# Patient Record
Sex: MALE | Race: WHITE | NOT HISPANIC OR LATINO | Employment: FULL TIME | URBAN - METROPOLITAN AREA
[De-identification: names, ages, dates, MRNs, and addresses within clinical notes are randomized per-mention and may not be internally consistent; named-entity substitution may affect disease eponyms.]

---

## 2018-03-15 ENCOUNTER — OFFICE VISIT (OUTPATIENT)
Dept: OTOLARYNGOLOGY | Facility: CLINIC | Age: 28
End: 2018-03-15
Payer: COMMERCIAL

## 2018-03-15 VITALS
RESPIRATION RATE: 16 BRPM | HEIGHT: 68 IN | SYSTOLIC BLOOD PRESSURE: 159 MMHG | DIASTOLIC BLOOD PRESSURE: 82 MMHG | BODY MASS INDEX: 19.88 KG/M2 | HEART RATE: 90 BPM | WEIGHT: 131.2 LBS

## 2018-03-15 DIAGNOSIS — H69.93 DYSFUNCTION OF BOTH EUSTACHIAN TUBES: ICD-10-CM

## 2018-03-15 DIAGNOSIS — H61.23 BILATERAL IMPACTED CERUMEN: Primary | ICD-10-CM

## 2018-03-15 PROCEDURE — 99213 OFFICE O/P EST LOW 20 MIN: CPT | Mod: 25 | Performed by: OTOLARYNGOLOGY

## 2018-03-15 PROCEDURE — 69210 REMOVE IMPACTED EAR WAX UNI: CPT | Performed by: OTOLARYNGOLOGY

## 2018-03-15 NOTE — PATIENT INSTRUCTIONS
Scheduling Information  To schedule your CT/MRI scan, please contact [facility] at [phone] OR [facility] at [phone]    To schedule your Surgery, please contact our Specialty Schedulers at [phone]    ** If a CT scan or biopsy were ordered/done, Dr. Chen will need to see you back in clinic to go over your biopsy results or CT/MRI scan. He will go over the images from your scan with you and discuss treatment based on your results.     ENT Clinic Locations Clinic Hours Telephone Number     [facility]  [address]  [place] [address]   E/O Thursday:      7:30am -- 4:00pm   To schedule/reschedule an appointment with   Dr. Chen,   please contact our   Specialty Scheduling Department at:     [phone]       [facility] [place]  [address] [address]     Monday:              12:00pm -- 4:00pm    Tuesday:               8:30am -- 4:30pm    Wednesday:        12:00pm -- 4:00pm    E/O Thursday:        8:00am - 4:30pm           Urgent Care Locations Clinic Hours Telephone Numbers     [facility] [place]  [address]  [place] [address]     Monday-Friday:     11:00am   9:00pm    Saturday-Sunday:  9:00am   5:00pm   [phone]     [facility] [place]  [address] [place] [address]     Monday-Friday:      5:00pm - 9:00pm     Saturday-Sunday:  9:00am   5:00pm   [phone]

## 2018-03-15 NOTE — PROGRESS NOTES
History of Present Illness - Arcadio Adams is a 27 year old male I have seen in the past with infected sebaceous cysts. Today he returns because he feels his ears have been plugged for weeks. He is about to embark on a trip to [place], and wanted to make sure his ears were taken care of first. He knows he has frequent cerumen accumulation. He also reports some trouble with autoinsufflation.    Past Medical History -   Patient Active Problem List   Diagnosis     Acne vulgaris     CARDIOVASCULAR SCREENING; LDL GOAL LESS THAN 160     Primary hyperthyroidism     Vitreous syneresis of right eye       Current Medications -   Current Outpatient Prescriptions:      clindamycin (CLINDAMAX) 1 % gel, Apply topically At Bedtime To the face and back., Disp: 45 g, Rfl: 2     benzoyl peroxide 5 % gel, Apply topically At Bedtime To the face and back., Disp: 45 g, Rfl: 2     amoxicillin-clavulanate (AUGMENTIN) 875-125 MG per tablet, Take 1 tablet by mouth 2 times daily (Patient not taking: Reported on 3/15/2018), Disp: 20 tablet, Rfl: 0     methimazole (TAPAZOLE) 10 MG tablet, , Disp: , Rfl: 1     tretinoin (RETIN-A) 0.05 % cream, Apply topically every morning To the face and back. (Patient not taking: Reported on 3/15/2018), Disp: 45 g, Rfl: 1    Allergies - No Known Allergies    Social History -   Social History     Social History     Marital status: Single     Spouse name: N/A     Number of children: N/A     Years of education: N/A     Social History Main Topics     Smoking status: Never Smoker     Smokeless tobacco: Never Used     Alcohol use Yes     Drug use: No     Sexual activity: No     Other Topics Concern     None     Social History Narrative       Family History -   Family History   Problem Relation Age of Onset     C.A.D. No family hx of      CEREBROVASCULAR DISEASE No family hx of      Glaucoma No family hx of      Macular Degeneration No family hx of      Myocardial Infarction Maternal Uncle 54     Anesthesia  "Reaction No family hx of      Thrombophilia No family hx of      Hypertension Mother      Bleeding Disorder No family hx of      Coronary Artery Disease No family hx of      DIABETES No family hx of      CANCER No family hx of        Review of Systems - As per HPI and PMHx, otherwise 7 system review of the head and neck negative. 10+ system review negative.    Exam:  /82  Pulse 90  Resp 16  Ht 1.715 m (5' 7.5\")  Wt 59.5 kg (131 lb 3.2 oz)  BMI 20.25 kg/m2  General - The patient is well nourished and well developed, and appears to have good nutritional status.  Alert and oriented to person and place, answers questions and cooperates with examination appropriately.   Head and Face - Normocephalic and atraumatic, with no gross asymmetry noted of the contour of the facial features.  The facial nerve is intact, with strong symmetric movements.  Eyes - Extraocular movements intact.  Sclera were not icteric or injected, conjunctiva were pink and moist.  Ears - after cerumen removed, bilateral ear canals and Tympanic membranes appear normal.    Procedure: Cerumen Disimpaction  Diagnosis: Cerumen Impaction    Procedure and Findings  Ears - On examination of the ears, I found that the  ears were impacted with dense cerumen obscuring visualization of the right and left TM.  Therefore, I positioned the patient and I used the binocular surgical microscope to assist in visualization of the affected ear(s).  I began by using a cerumen loop to gently lift the edges of the cerumen mass away from the walls of the external canal.  Once I did this, I was able to suction away fragments of wax and debris using suction.  Once the mass was loose enough, the entire plug was pulled from the canal with microforceps.  The tympanic membrane was intact without sign of perforation or middle ear effusion and minimal ear canal trauma.             A/P - Arcadio Adams is a 27 year old male with bilateral cerumen impaction. I have " cleared this today. I also did some counseling on autoinsufflation technique.  Return if there is more trouble with hearing, or if he would like to set up excision of his multiple sebaceous cysts.      Dr. Albert Chen MD  Otolaryngology  [facility]

## 2018-03-15 NOTE — MR AVS SNAPSHOT
After Visit Summary   3/15/2018    Arcadio Adams    MRN: [id number]           Patient Information     Date Of Birth          1990        Visit Information        Provider Department      3/15/2018 9:15 AM Albert Chen MD [facility]        Today's Diagnoses     Bilateral impacted cerumen    -  1    Dysfunction of both eustachian tubes          Care Instructions    Scheduling Information  To schedule your CT/MRI scan, please contact [facility] at [phone] OR [facility] at [phone]    To schedule your Surgery, please contact our Specialty Schedulers at [phone]    ** If a CT scan or biopsy were ordered/done, Dr. Chen will need to see you back in clinic to go over your biopsy results or CT/MRI scan. He will go over the images from your scan with you and discuss treatment based on your results.     ENT Clinic Locations Clinic Hours Telephone Number     [facility]  [address]  [place] [address]   E/O Thursday:      7:30am -- 4:00pm   To schedule/reschedule an appointment with   Dr. Chen,   please contact our   Specialty Scheduling Department at:     [phone]       [facility]  [address] [place] [address]     Monday:              12:00pm -- 4:00pm    Tuesday:               8:30am -- 4:30pm    Wednesday:        12:00pm -- 4:00pm    E/O Thursday:        8:00am - 4:30pm           Urgent Care Locations Clinic Hours Telephone Numbers     [facility]  [address]  [place] [address]     Monday-Friday:     11:00am - 9:00pm    Saturday-Sunday:  9:00am - 5:00pm   [phone]     [facility] [place]  [address] [place] [address]     Monday-Friday:      5:00pm - 9:00pm     Saturday-Sunday:  9:00am - 5:00pm   [phone]               Follow-ups after your visit        Who to contact     If you have questions or need follow up information about today's clinic visit or your schedule please  "contact [facility] directly at [phone].  Normal or non-critical lab and imaging results will be communicated to you by MyChart, letter or phone within 4 business days after the clinic has received the results. If you do not hear from us within 7 days, please contact the clinic through MyChart or phone. If you have a critical or abnormal lab result, we will notify you by phone as soon as possible.  Submit refill requests through [employer] or call your pharmacy and they will forward the refill request to us. Please allow 3 business days for your refill to be completed.          Additional Information About Your Visit        meebeeharMobile Messenger Information     [employer] gives you secure access to your electronic health record. If you see a primary care provider, you can also send messages to your care team and make appointments. If you have questions, please call your primary care clinic.  If you do not have a primary care provider, please call [phone] and they will assist you.        Care EveryWhere ID     This is your Care EveryWhere ID. This could be used by other organizations to access your [facility] medical records  [id number]        Your Vitals Were     Pulse Respirations Height BMI (Body Mass Index)          90 16 1.715 m (5' 7.5\") 20.25 kg/m2         Blood Pressure from Last 3 Encounters:   03/15/18 159/82   10/06/16 126/78   06/30/16 140/85    Weight from Last 3 Encounters:   03/15/18 59.5 kg (131 lb 3.2 oz)   11/03/16 58.3 kg (128 lb 9.6 oz)   10/20/16 58.1 kg (128 lb)              We Performed the Following     [facility]        Primary Care Provider Office Phone # Fax #    Lavell Hand [phone]       [address]  [place] [facility] [address]        Equal Access to Services     [facility] MARISA : Hadii aad ku hadasho Soomaali, waaxda luqadaha, qaybta kaalmada adeegyada, waxay rhiannon cerda. So [facility] [phone].    ATENCIÓN: Si habla español, tiene a dunbar " disposición servicios gratuitos de asistencia lingüística. La chung [phone].    We comply with applicable federal civil rights laws and [place] laws. We do not discriminate on the basis of race, color, national origin, age, disability, sex, sexual orientation, or gender identity.            Thank you!     Thank you for choosing [facility]  for your care. Our goal is always to provide you with excellent care. Hearing back from our patients is one way we can continue to improve our services. Please take a few minutes to complete the written survey that you may receive in the mail after your visit with us. Thank you!             Your Updated Medication List - Protect others around you: Learn how to safely use, store and throw away your medicines at www.disposemymeds.org.          This list is accurate as of 3/15/18  9:39 AM.  Always use your most recent med list.                   Brand Name Dispense Instructions for use Diagnosis    amoxicillin-clavulanate 875-125 MG per tablet    AUGMENTIN    20 tablet    Take 1 tablet by mouth 2 times daily    Sebaceous cyst       benzoyl peroxide 5 % topical gel     45 g    Apply topically At Bedtime To the face and back.    Acne vulgaris       clindamycin 1 % topical gel    CLINDAMAX    45 g    Apply topically At Bedtime To the face and back.    Acne vulgaris       methimazole 10 MG tablet    TAPAZOLE          tretinoin 0.05 % cream    RETIN-A    45 g    Apply topically every morning To the face and back.    Acne vulgaris

## 2018-03-15 NOTE — LETTER
3/15/2018         RE: Arcadio Adams  [address]  [place] [address]        Dear Colleague,    Thank you for referring your patient, Arcadio Adams, to the [facility]. Please see a copy of my visit note below.    History of Present Illness - Arcadio Adams is a 27 year old male I have seen in the past with infected sebaceous cysts. Today he returns because he feels his ears have been plugged for weeks. He is about to embark on a trip to [place], and wanted to make sure his ears were taken care of first. He knows he has frequent cerumen accumulation. He also reports some trouble with autoinsufflation.    Past Medical History -   Patient Active Problem List   Diagnosis     Acne vulgaris     CARDIOVASCULAR SCREENING; LDL GOAL LESS THAN 160     Primary hyperthyroidism     Vitreous syneresis of right eye       Current Medications -   Current Outpatient Prescriptions:      clindamycin (CLINDAMAX) 1 % gel, Apply topically At Bedtime To the face and back., Disp: 45 g, Rfl: 2     benzoyl peroxide 5 % gel, Apply topically At Bedtime To the face and back., Disp: 45 g, Rfl: 2     amoxicillin-clavulanate (AUGMENTIN) 875-125 MG per tablet, Take 1 tablet by mouth 2 times daily (Patient not taking: Reported on 3/15/2018), Disp: 20 tablet, Rfl: 0     methimazole (TAPAZOLE) 10 MG tablet, , Disp: , Rfl: 1     tretinoin (RETIN-A) 0.05 % cream, Apply topically every morning To the face and back. (Patient not taking: Reported on 3/15/2018), Disp: 45 g, Rfl: 1    Allergies - No Known Allergies    Social History -   Social History     Social History     Marital status: Single     Spouse name: N/A     Number of children: N/A     Years of education: N/A     Social History Main Topics     Smoking status: Never Smoker     Smokeless tobacco: Never Used     Alcohol use Yes     Drug use: No     Sexual activity: No     Other Topics Concern     None     Social History Narrative       Family History -  "  Family History   Problem Relation Age of Onset     C.A.D. No family hx of      CEREBROVASCULAR DISEASE No family hx of      Glaucoma No family hx of      Macular Degeneration No family hx of      Myocardial Infarction Maternal Uncle 54     Anesthesia Reaction No family hx of      Thrombophilia No family hx of      Hypertension Mother      Bleeding Disorder No family hx of      Coronary Artery Disease No family hx of      DIABETES No family hx of      CANCER No family hx of        Review of Systems - As per HPI and PMHx, otherwise 7 system review of the head and neck negative. 10+ system review negative.    Exam:  /82  Pulse 90  Resp 16  Ht 1.715 m (5' 7.5\")  Wt 59.5 kg (131 lb 3.2 oz)  BMI 20.25 kg/m2  General - The patient is well nourished and well developed, and appears to have good nutritional status.  Alert and oriented to person and place, answers questions and cooperates with examination appropriately.   Head and Face - Normocephalic and atraumatic, with no gross asymmetry noted of the contour of the facial features.  The facial nerve is intact, with strong symmetric movements.  Eyes - Extraocular movements intact.  Sclera were not icteric or injected, conjunctiva were pink and moist.  Ears - after cerumen removed, bilateral ear canals and Tympanic membranes appear normal.    Procedure: Cerumen Disimpaction  Diagnosis: Cerumen Impaction    Procedure and Findings  Ears - On examination of the ears, I found that the  ears were impacted with dense cerumen obscuring visualization of the right and left TM.  Therefore, I positioned the patient and I used the binocular surgical microscope to assist in visualization of the affected ear(s).  I began by using a cerumen loop to gently lift the edges of the cerumen mass away from the walls of the external canal.  Once I did this, I was able to suction away fragments of wax and debris using suction.  Once the mass was loose enough, the entire plug was pulled " from the canal with microforceps.  The tympanic membrane was intact without sign of perforation or middle ear effusion and minimal ear canal trauma.             A/P - Arcadio Adams is a 27 year old male with bilateral cerumen impaction. I have cleared this today. I also did some counseling on autoinsufflation technique.  Return if there is more trouble with hearing, or if he would like to set up excision of his multiple sebaceous cysts.      Dr. Albert Chen MD  Otolaryngology  [facility]      Again, thank you for allowing me to participate in the care of your patient.        Sincerely,        Albert Chen MD

## 2018-05-03 ENCOUNTER — OFFICE VISIT (OUTPATIENT)
Dept: ALLERGY | Facility: CLINIC | Age: 28
End: 2018-05-03
Payer: COMMERCIAL

## 2018-05-03 VITALS
SYSTOLIC BLOOD PRESSURE: 158 MMHG | TEMPERATURE: 97.3 F | BODY MASS INDEX: 20.4 KG/M2 | DIASTOLIC BLOOD PRESSURE: 80 MMHG | WEIGHT: 130 LBS | OXYGEN SATURATION: 97 % | HEIGHT: 67 IN | HEART RATE: 86 BPM

## 2018-05-03 DIAGNOSIS — J30.89 ALLERGIC RHINITIS DUE TO DUST MITE: Primary | ICD-10-CM

## 2018-05-03 DIAGNOSIS — J30.1 CHRONIC SEASONAL ALLERGIC RHINITIS DUE TO POLLEN: ICD-10-CM

## 2018-05-03 PROCEDURE — 36415 COLL VENOUS BLD VENIPUNCTURE: CPT | Performed by: ALLERGY & IMMUNOLOGY

## 2018-05-03 PROCEDURE — 99203 OFFICE O/P NEW LOW 30 MIN: CPT | Mod: 25 | Performed by: ALLERGY & IMMUNOLOGY

## 2018-05-03 PROCEDURE — 86003 ALLG SPEC IGE CRUDE XTRC EA: CPT | Performed by: ALLERGY & IMMUNOLOGY

## 2018-05-03 PROCEDURE — 95004 PERQ TESTS W/ALRGNC XTRCS: CPT | Performed by: ALLERGY & IMMUNOLOGY

## 2018-05-03 NOTE — PROGRESS NOTES
Arcadio Adams was seen in the Allergy Clinic at [facility]. 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 Will obtain in vitro IgE testing to cat and dog  2. Continue second generation antihistamine such as claritin, zyrtec, or allegra daily as needed  3. Follow-up in the allergy clinic as needed      Arcadio Adams is a 27 year old White male being seen today in consultation for allergies. He states that he had a pet allergy as a child and would like to know if he is still allergic as he is considering getting a dog. Vishal has been around animals on occasion as an adult and has not noticed any particular allergy symptoms. He does have seasonal symptoms consisting of sneezing, rhinorrhea, and nasal congestion in the fall months. He will take claritin or allegra as needed for these symptoms and finds it to be helpful.       Past Medical History:   Diagnosis Date     Abscess 7/15/2013     Acne      Prolonged emergence from general anesthesia     and nausea     Family History   Problem Relation Age of Onset     Hypertension Mother      Myocardial Infarction Maternal Uncle 54     C.A.D. No family hx of      CEREBROVASCULAR DISEASE No family hx of      Glaucoma No family hx of      Macular Degeneration No family hx of      Anesthesia Reaction No family hx of      Thrombophilia No family hx of      Bleeding Disorder No family hx of      Coronary Artery Disease No family hx of      DIABETES No family hx of      CANCER No family hx of      Past Surgical History:   Procedure Laterality Date     ENT SURGERY Right 10-20-16    I & D Neck abscess. Dr. Chen     EXTRACTION(S) DENTAL      wisdom     PE TUBES  age 6       ENVIRONMENTAL HISTORY: [place]. The home is heated with a forced air. They does have central air conditioning. The patient's bedroom is furnished with hard  jose antonio in bedroom.  Pets inside the house include None. There is not history of cockroach or mice infestation. There is/are 0 smokers in the house.  The house does not have a damp basement.     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 He has missed 0 days of school/work. He lives with his roommate.  His roommate works as a retail.    REVIEW OF SYSTEMS:  General: negative for weight gain. negative for weight loss. negative for changes in sleep.   Eyes: negative for itching. negative for redness. negative for tearing/watering.  Ears: negative for fullness. negative for hearing loss. negative for dizziness.   Nose: negative for snoring.negative for changes in smell. negative for drainage.   Throat: negative for hoarseness. negative for sore throat. negative for trouble swallowing.   Lungs: negative for shortness of breath.negative for wheezing. negative for sputum production.   Cardiovascular: negative for chest pain. negative for swelling of ankles. negative for fast or irregular heartbeat.   Gastrointestinal: negative for nausea. negative for heartburn. negative for acid reflux.   Musculoskeletal: negative for joint pain. negative for joint stiffness. negative for joint swelling.   Neurologic: negative for seizures. negative for fainting. negative for weakness.   Psychiatric: negative for changes in mood. negative for anxiety.   Endocrine: negative for cold intolerance. negative for heat intolerance. negative for tremors.   Hematologic: negative for easy bruising. negative for easy bleeding.  Integumentary: negative for rash. negative for scaling. negative for nail changes.     No current outpatient prescriptions on file.  Immunization History   Administered Date(s) Administered     DTAP (<7y) 1990, 1990, 1990, 01/04/1993, 05/29/1996, 08/28/2003     HPV 10/06/2016     HepB 08/15/2002, 01/16/2003, 05/28/2003     Hib (PRP-T) 1990, 1990, 1990, 01/04/1993     Influenza (IIV3) PF  "11/02/2005, 11/10/2007, 11/20/2008, 09/26/2009, 12/11/2010     Influenza Vaccine IM 3yrs+ 4 Valent IIV4 10/06/2016     MMR 08/20/1992, 08/15/2002     OPV, trivalent, live 1991, 1991, 01/04/1993, 05/29/1996     TD (ADULT, 7+) 05/28/2003     TDAP Vaccine (Adacel) 06/04/2015     No Known Allergies      EXAM:   /80  Pulse 86  Temp 97.3  F (36.3  C) (Oral)  Ht 1.706 m (5' 7.16\")  Wt 59 kg (130 lb)  SpO2 97%  BMI 20.26 kg/m2  GENERAL APPEARANCE: alert, cooperative and not in distress  SKIN: no rashes, no lesions  HEAD: atraumatic, normocephalic  EYES: lids and lashes normal, conjunctivae and sclerae clear, pupils equal, round, reactive to light, EOM full and intact  ENT: no scars or lesions, nasal exam showed no discharge, swelling or lesions noted, otoscopy showed external auditory canals clear, tympanic membranes normal, tongue midline and normal, soft palate, uvula, and tonsils normal  NECK: no asymmetry, masses, or scars, supple without significant adenopathy  LUNGS: unlabored respirations, no intercostal retractions or accessory muscle use, clear to auscultation without rales or wheezes  HEART: regular rate and rhythm without murmurs and normal S1 and S2  MUSCULOSKELETAL: no musculoskeletal defects are noted  NEURO: no focal deficits noted  PSYCH: does not appear depressed or anxious    WORKUP: Skin testing    Skin testing was performed to our adult aeroallergen panel. He had positive reaction to dust mite (dp and df) and ragweed. All others were negative including histamine control.    ASSESSMENT/PLAN:  Arcadio Adams is a 27 year old male here for evaluation of allergies. Skin prick testing could not be fully interpreted due to blunted histamine control. We discussed pursuing further evaluation with IgE testing and patient was agreeable with this.    1. Will obtain in vitro IgE testing to cat and dog  2. Continue second generation antihistamine such as claritin, zyrtec, or allegra " daily as needed  3. Follow-up in the allergy clinic as needed      Philippe Marcus MD  Allergy/Immunology  [place], [place]      Chart documentation done in part with Dragon Voice Recognition Software. Although reviewed after completion, some word and grammatical errors may remain.

## 2018-05-03 NOTE — LETTER
5/3/2018         RE: Arcadio Adams  [address]  [place] [address]        Dear Colleague,    Thank you for referring your patient, Arcadio Adams, to the [facility]. Please see a copy of my visit note below.    Arcadio Adams was seen in the Allergy Clinic at [facility]. 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 Will obtain in vitro IgE testing to cat and dog  2. Continue second generation antihistamine such as claritin, zyrtec, or allegra daily as needed  3. Follow-up in the allergy clinic as needed      Arcadio Adams is a 27 year old White male being seen today in consultation for allergies. He states that he had a pet allergy as a child and would like to know if he is still allergic as he is considering getting a dog. Vishal has been around animals on occasion as an adult and has not noticed any particular allergy symptoms. He does have seasonal symptoms consisting of sneezing, rhinorrhea, and nasal congestion in the fall months. He will take claritin or allegra as needed for these symptoms and finds it to be helpful.       Past Medical History:   Diagnosis Date     Abscess 7/15/2013     Acne      Prolonged emergence from general anesthesia     and nausea     Family History   Problem Relation Age of Onset     Hypertension Mother      Myocardial Infarction Maternal Uncle 54     C.A.D. No family hx of      CEREBROVASCULAR DISEASE No family hx of      Glaucoma No family hx of      Macular Degeneration No family hx of      Anesthesia Reaction No family hx of      Thrombophilia No family hx of      Bleeding Disorder No family hx of      Coronary Artery Disease No family hx of      DIABETES No family hx of      CANCER No family hx of      Past Surgical History:   Procedure Laterality Date     ENT SURGERY Right 10-20-16    I & D Neck abscess. Dr. Chen      EXTRACTION(S) DENTAL      wisdom     PE TUBES  age 6       ENVIRONMENTAL HISTORY: [place]. The home is heated with a forced air. They does have central air conditioning. The patient's bedroom is furnished with hard jose antonio in bedroom.  Pets inside the house include None. There is not history of cockroach or mice infestation. There is/are 0 smokers in the house.  The house does not have a damp basement.     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 He has missed 0 days of school/work. He lives with his roommate.  His roommate works as a retail.    REVIEW OF SYSTEMS:  General: negative for weight gain. negative for weight loss. negative for changes in sleep.   Eyes: negative for itching. negative for redness. negative for tearing/watering.  Ears: negative for fullness. negative for hearing loss. negative for dizziness.   Nose: negative for snoring.negative for changes in smell. negative for drainage.   Throat: negative for hoarseness. negative for sore throat. negative for trouble swallowing.   Lungs: negative for shortness of breath.negative for wheezing. negative for sputum production.   Cardiovascular: negative for chest pain. negative for swelling of ankles. negative for fast or irregular heartbeat.   Gastrointestinal: negative for nausea. negative for heartburn. negative for acid reflux.   Musculoskeletal: negative for joint pain. negative for joint stiffness. negative for joint swelling.   Neurologic: negative for seizures. negative for fainting. negative for weakness.   Psychiatric: negative for changes in mood. negative for anxiety.   Endocrine: negative for cold intolerance. negative for heat intolerance. negative for tremors.   Hematologic: negative for easy bruising. negative for easy bleeding.  Integumentary: negative for rash. negative for scaling. negative for nail changes.     No current outpatient prescriptions on file.  Immunization History  "  Administered Date(s) Administered     DTAP (<7y) 1990, 1990, 1990, 01/04/1993, 05/29/1996, 08/28/2003     HPV 10/06/2016     HepB 08/15/2002, 01/16/2003, 05/28/2003     Hib (PRP-T) 1990, 1990, 1991, 01/04/1993     Influenza (IIV3) PF 11/02/2005, 11/10/2007, 11/20/2008, 09/26/2009, 12/11/2010     Influenza Vaccine IM 3yrs+ 4 Valent IIV4 10/06/2016     MMR 08/20/1992, 08/15/2002     OPV, trivalent, live 1991, 1991, 01/04/1993, 05/29/1996     TD (ADULT, 7+) 05/28/2003     TDAP Vaccine (Adacel) 06/04/2015     No Known Allergies      EXAM:   /80  Pulse 86  Temp 97.3  F (36.3  C) (Oral)  Ht 1.706 m (5' 7.16\")  Wt 59 kg (130 lb)  SpO2 97%  BMI 20.26 kg/m2  GENERAL APPEARANCE: alert, cooperative and not in distress  SKIN: no rashes, no lesions  HEAD: atraumatic, normocephalic  EYES: lids and lashes normal, conjunctivae and sclerae clear, pupils equal, round, reactive to light, EOM full and intact  ENT: no scars or lesions, nasal exam showed no discharge, swelling or lesions noted, otoscopy showed external auditory canals clear, tympanic membranes normal, tongue midline and normal, soft palate, uvula, and tonsils normal  NECK: no asymmetry, masses, or scars, supple without significant adenopathy  LUNGS: unlabored respirations, no intercostal retractions or accessory muscle use, clear to auscultation without rales or wheezes  HEART: regular rate and rhythm without murmurs and normal S1 and S2  MUSCULOSKELETAL: no musculoskeletal defects are noted  NEURO: no focal deficits noted  PSYCH: does not appear depressed or anxious    WORKUP: Skin testing    Skin testing was performed to our adult aeroallergen panel. He had positive reaction to dust mite (dp and df) and ragweed. All others were negative including histamine control.    ASSESSMENT/PLAN:  Arcadio Adams is a 27 year old male here for evaluation of allergies. Skin prick testing could not be fully " interpreted due to blunted histamine control. We discussed pursuing further evaluation with IgE testing and patient was agreeable with this.    1. Will obtain in vitro IgE testing to cat and dog  2. Continue second generation antihistamine such as claritin, zyrtec, or allegra daily as needed  3. Follow-up in the allergy clinic as needed      Philippe Marcus MD  Allergy/Immunology  [facility] and [place], [place]      Chart documentation done in part with Dragon Voice Recognition Software. Although reviewed after completion, some word and grammatical errors may remain.      Again, thank you for allowing me to participate in the care of your patient.        Sincerely,        Philippe Marcus MD

## 2018-05-03 NOTE — PATIENT INSTRUCTIONS
If you have any questions regarding your allergies, asthma, or what we discussed during your visit today please call the allergy clinic or contact us via Crovat.    [facility] ([place], [place]): [phone]  [facility]: [phone]

## 2018-05-03 NOTE — MR AVS SNAPSHOT
After Visit Summary   5/3/2018    Arcadio Adams    MRN: [id number]           Patient Information     Date Of Birth          1990        Visit Information        Provider Department      5/3/2018 10:20 AM Philippe Marcus MD [facility]        Today's Diagnoses     Allergic rhinitis due to dust mite    -  1    Chronic seasonal allergic rhinitis due to pollen          Care Instructions    If you have any questions regarding your allergies, asthma, or what we discussed during your visit today please call the allergy clinic or contact us via Shiny Ads.    [facility] ([place], [place]): [phone]  [facility]: [phone]            Follow-ups after your visit        Who to contact     If you have questions or need follow up information about today's clinic visit or your schedule please contact [facility] directly at [phone].  Normal or non-critical lab and imaging results will be communicated to you by YottaMarkhart, letter or phone within 4 business days after the clinic has received the results. If you do not hear from us within 7 days, please contact the clinic through EG Technologyt or phone. If you have a critical or abnormal lab result, we will notify you by phone as soon as possible.  Submit refill requests through Shiny Ads or call your pharmacy and they will forward the refill request to us. Please allow 3 business days for your refill to be completed.          Additional Information About Your Visit        MyChart Information     Shiny Ads gives you secure access to your electronic health record. If you see a primary care provider, you can also send messages to your care team and make appointments. If you have questions, please call your primary care clinic.  If you do not have a primary care provider, please call [phone] and they will assist you.        Care EveryWhere ID     This is your Care EveryWhere ID. This could be used by  "other organizations to access your [facility] medical records  [id number]        Your Vitals Were     Pulse Temperature Height Pulse Oximetry BMI (Body Mass Index)       86 97.3  F (36.3  C) (Oral) 1.706 m (5' 7.16\") 97% 20.26 kg/m2        Blood Pressure from Last 3 Encounters:   05/03/18 158/80   03/15/18 159/82   10/06/16 126/78    Weight from Last 3 Encounters:   05/03/18 59 kg (130 lb)   03/15/18 59.5 kg (131 lb 3.2 oz)   11/03/16 58.3 kg (128 lb 9.6 oz)              We Performed the Following     Allergen cat epithellium IgE     Allergen dog epithelium IgE     ALLERGY SKIN TESTS,ALLERGENS        Primary Care Provider Office Phone # Fax #    Lavell Hand [phone]       [address]  [facility] [address]        Equal Access to Services     [facility] : Hadii aad ku hadasho Soomaali, waaxda luqadaha, qaybta kaalmada adeegyada, waxay idiin hayaan wendy bella . So [facility] [phone].    ATENCIÓN: Si guerita pinzon, tiene a dunbar disposición servicios gratuitos de asistencia lingüística. Llame al [phone].    We comply with applicable federal civil rights laws and [place] laws. We do not discriminate on the basis of race, color, national origin, age, disability, sex, sexual orientation, or gender identity.            Thank you!     Thank you for choosing [facility]  for your care. Our goal is always to provide you with excellent care. Hearing back from our patients is one way we can continue to improve our services. Please take a few minutes to complete the written survey that you may receive in the mail after your visit with us. Thank you!             Your Updated Medication List - Protect others around you: Learn how to safely use, store and throw away your medicines at www.disposemymeds.org.      Notice  As of 5/3/2018 11:08 AM    You have not been prescribed any medications.      "

## 2018-05-07 LAB
CAT DANDER IGG QN: <0.1 KU(A)/L
DOG DANDER+EPITH IGE QN: <0.1 KU(A)/L

## 2018-09-21 ENCOUNTER — OFFICE VISIT (OUTPATIENT)
Dept: FAMILY MEDICINE | Facility: CLINIC | Age: 28
End: 2018-09-21
Payer: COMMERCIAL

## 2018-09-21 VITALS
BODY MASS INDEX: 19.93 KG/M2 | DIASTOLIC BLOOD PRESSURE: 72 MMHG | HEIGHT: 67 IN | RESPIRATION RATE: 16 BRPM | HEART RATE: 84 BPM | OXYGEN SATURATION: 99 % | WEIGHT: 127 LBS | SYSTOLIC BLOOD PRESSURE: 132 MMHG | TEMPERATURE: 98.8 F

## 2018-09-21 DIAGNOSIS — Z23 NEED FOR PROPHYLACTIC VACCINATION AND INOCULATION AGAINST INFLUENZA: ICD-10-CM

## 2018-09-21 PROCEDURE — 90471 IMMUNIZATION ADMIN: CPT | Performed by: NURSE PRACTITIONER

## 2018-09-21 PROCEDURE — 90686 IIV4 VACC NO PRSV 0.5 ML IM: CPT | Performed by: NURSE PRACTITIONER

## 2018-09-21 PROCEDURE — 99213 OFFICE O/P EST LOW 20 MIN: CPT | Mod: 25 | Performed by: NURSE PRACTITIONER

## 2018-09-21 RX ORDER — CEPHALEXIN 500 MG/1
500 CAPSULE ORAL 3 TIMES DAILY
Qty: 30 CAPSULE | Refills: 0 | Status: SHIPPED | OUTPATIENT
Start: 2018-09-21 | End: 2018-10-22

## 2018-09-21 ASSESSMENT — PAIN SCALES - GENERAL: PAINLEVEL: SEVERE PAIN (7)

## 2018-09-21 NOTE — PROGRESS NOTES
SUBJECTIVE:   Arcadio Adams is a 28 year old male who presents to clinic today for the following health issues:      Concern - Possibly infected Cyst on back of neck  Onset: Tuesday    Description:   Swelling, painful and larger on the neck    Intensity: moderate    Progression of Symptoms:  worsening    Accompanying Signs & Symptoms:  no    Previous history of similar problem:   yes    Precipitating factors:   Worsened by: unsure    Alleviating factors:  Improved by: nothing    Therapies Tried and outcome: nothing    This has occurred before in the past, sometimes they done antibiotics, and sometimes they have opened up the cyst and drained him.  Unsure what this is causing by    Problem list and histories reviewed & adjusted, as indicated.  Additional history: as documented    Patient Active Problem List   Diagnosis     Acne vulgaris     CARDIOVASCULAR SCREENING; LDL GOAL LESS THAN 160     Primary hyperthyroidism     Vitreous syneresis of right eye     Allergic rhinitis due to dust mite     Chronic seasonal allergic rhinitis due to pollen     Past Surgical History:   Procedure Laterality Date     ENT SURGERY Right 10-20-16    I & D Neck abscess. Dr. Chen     EXTRACTION(S) DENTAL      wisdom     PE TUBES  age 6       Social History   Substance Use Topics     Smoking status: Never Smoker     Smokeless tobacco: Never Used     Alcohol use Yes     Family History   Problem Relation Age of Onset     Hypertension Mother      Myocardial Infarction Maternal Uncle 54     C.A.D. No family hx of      Cerebrovascular Disease No family hx of      Glaucoma No family hx of      Macular Degeneration No family hx of      Anesthesia Reaction No family hx of      Thrombophilia No family hx of      Bleeding Disorder No family hx of      Coronary Artery Disease No family hx of      Diabetes No family hx of      Cancer No family hx of            Reviewed and updated as needed this visit by clinical staff  Tobacco  Allergies  " Meds  Problems  Med Hx  Surg Hx  Fam Hx  Soc Hx        Reviewed and updated as needed this visit by Provider  Allergies  Meds  Problems         ROS:  skin    OBJECTIVE:     /72 (BP Location: Right arm, Patient Position: Sitting, Cuff Size: Adult Regular)  Pulse 84  Temp 98.8  F (37.1  C) (Oral)  Resp 16  Ht 1.702 m (5' 7\")  Wt 57.6 kg (127 lb)  SpO2 99%  BMI 19.89 kg/m2  Body mass index is 19.89 kg/(m^2).  GENERAL: healthy, alert and no distress  SKIN: right upper neck dime sized lump noted slightly under skin, slightly tender, mobile. Quarter sized lump noted at the right base of the skull-tender to touch, mobile    Diagnostic Test Results:  none     ASSESSMENT/PLAN:         1. Cyst of neck  Start antibiotics return in 5-10 days if not improving may need to open it up.  Make appointment with MD. it is under the skin.  May need to consider dermatology referral if this is ongoing  - cephALEXin (KEFLEX) 500 MG capsule; Take 1 capsule (500 mg) by mouth 3 times daily  Dispense: 30 capsule; Refill: 0    2. Need for prophylactic vaccination and inoculation against influenza  Given  - FLU VACCINE, SPLIT VIRUS, IM (QUADRIVALENT) [85576]- >3 YRS  - Vaccine Administration, Initial [33127]    FUTURE APPOINTMENTS:       - Follow-up visit in 5-10 days as needed    JUDY Raymundo, NP-C  Does not appear able to pop it [facility]      Injectable Influenza Immunization Documentation    1.  Is the person to be vaccinated sick today?   No    2. Does the person to be vaccinated have an allergy to a component   of the vaccine?   No  Egg Allergy Algorithm Link    3. Has the person to be vaccinated ever had a serious reaction   to influenza vaccine in the past?   No    4. Has the person to be vaccinated ever had Guillain-Barré syndrome?   No    Form completed by BEE         "

## 2018-09-21 NOTE — MR AVS SNAPSHOT
After Visit Summary   9/21/2018    Arcadio Adasm    MRN: [id number]           Patient Information     Date Of Birth          1990        Visit Information        Provider Department      9/21/2018 1:40 PM Kristie Van NP [facility]        Today's Diagnoses     Cyst of neck    -  1    Need for prophylactic vaccination and inoculation against influenza          Care Instructions    Follow up with MD in 7-10days if no or minimal improvement of cyst.           Follow-ups after your visit        Who to contact     If you have questions or need follow up information about today's clinic visit or your schedule please contact [facility] directly at [phone].  Normal or non-critical lab and imaging results will be communicated to you by MyChart, letter or phone within 4 business days after the clinic has received the results. If you do not hear from us within 7 days, please contact the clinic through pushdhart or phone. If you have a critical or abnormal lab result, we will notify you by phone as soon as possible.  Submit refill requests through adSage or call your pharmacy and they will forward the refill request to us. Please allow 3 business days for your refill to be completed.          Additional Information About Your Visit        MyChart Information     adSage gives you secure access to your electronic health record. If you see a primary care provider, you can also send messages to your care team and make appointments. If you have questions, please call your primary care clinic.  If you do not have a primary care provider, please call [phone] and they will assist you.        Care EveryWhere ID     This is your Care EveryWhere ID. This could be used by other organizations to access your [facility] medical records  [id number]        Your Vitals Were     Pulse Temperature Respirations Height Pulse Oximetry BMI (Body Mass Index)    84 98.8  F (37.1  C)  "(Oral) 16 1.702 m (5' 7\") 99% 19.89 kg/m2       Blood Pressure from Last 3 Encounters:   09/21/18 132/72   05/03/18 158/80   03/15/18 159/82    Weight from Last 3 Encounters:   09/21/18 57.6 kg (127 lb)   05/03/18 59 kg (130 lb)   03/15/18 59.5 kg (131 lb 3.2 oz)              Today, you had the following     No orders found for display         Today's Medication Changes          These changes are accurate as of 9/21/18  2:05 PM.  If you have any questions, ask your nurse or doctor.               Start taking these medicines.        Dose/Directions    cephALEXin 500 MG capsule   Commonly known as:  KEFLEX   Used for:  Cyst of neck   Started by:  Kristie Van NP        Dose:  500 mg   Take 1 capsule (500 mg) by mouth 3 times daily   Quantity:  30 capsule   Refills:  0            Where to get your medicines      These medications were sent to [id number] - [facility] [address]  [address] [facility] [address]     Phone:  [phone]     cephALEXin 500 MG capsule                Primary Care Provider Office Phone # Fax #    Lavell Hand [phone] [address]  [place] [facility] [address]        Equal Access to Services     [facility] AH: Hadii octavio ku hadasho Soomaali, waaxda luqadaha, qaybta kaalmada adeegyada, hair cerda. So [facility] [phone].    ATENCIÓN: Si habla español, tiene a dunbar disposición servicios gratuitos de asistencia lingüística. Llame al [phone].    We comply with applicable federal civil rights laws and [place] laws. We do not discriminate on the basis of race, color, national origin, age, disability, sex, sexual orientation, or gender identity.            Thank you!     Thank you for choosing [facility]  for your care. Our goal is always to provide you with excellent care. Hearing back from our patients is one way we can continue to improve our services. Please take a few minutes to complete the " written survey that you may receive in the mail after your visit with us. Thank you!             Your Updated Medication List - Protect others around you: Learn how to safely use, store and throw away your medicines at www.disposemymeds.org.          This list is accurate as of 9/21/18  2:05 PM.  Always use your most recent med list.                   Brand Name Dispense Instructions for use Diagnosis    cephALEXin 500 MG capsule    KEFLEX    30 capsule    Take 1 capsule (500 mg) by mouth 3 times daily    Cyst of neck

## 2018-10-11 ENCOUNTER — OFFICE VISIT (OUTPATIENT)
Dept: FAMILY MEDICINE | Facility: CLINIC | Age: 28
End: 2018-10-11
Payer: COMMERCIAL

## 2018-10-11 VITALS
WEIGHT: 125.6 LBS | RESPIRATION RATE: 20 BRPM | SYSTOLIC BLOOD PRESSURE: 132 MMHG | OXYGEN SATURATION: 100 % | HEIGHT: 67 IN | TEMPERATURE: 98.1 F | BODY MASS INDEX: 19.71 KG/M2 | DIASTOLIC BLOOD PRESSURE: 84 MMHG | HEART RATE: 79 BPM

## 2018-10-11 DIAGNOSIS — L02.11 ABSCESS OF NECK: Primary | ICD-10-CM

## 2018-10-11 PROCEDURE — 99213 OFFICE O/P EST LOW 20 MIN: CPT | Performed by: PHYSICIAN ASSISTANT

## 2018-10-11 ASSESSMENT — PAIN SCALES - GENERAL: PAINLEVEL: MODERATE PAIN (4)

## 2018-10-11 NOTE — PROGRESS NOTES
"  SUBJECTIVE:   Arcadio Adams is a 28 year old male who presents to clinic today for the following health issues:      Concern -  Cyst  Onset: about 2-3 weeks ago    Description:   Cyst on neck    Intensity: mild    Progression of Symptoms:  intermittent    Accompanying Signs & Symptoms:  None    Previous history of similar problem:   Yes    Precipitating factors:   Worsened by: touching and turning neck      Therapies Tried and outcome: ABXs(keflex), no relief                 No Known Allergies    Past Medical History:   Diagnosis Date     Abscess 7/15/2013     Acne      Prolonged emergence from general anesthesia     and nausea         Current Outpatient Prescriptions on File Prior to Visit:  cephALEXin (KEFLEX) 500 MG capsule Take 1 capsule (500 mg) by mouth 3 times daily (Patient not taking: Reported on 10/11/2018)     No current facility-administered medications on file prior to visit.     Social History   Substance Use Topics     Smoking status: Never Smoker     Smokeless tobacco: Never Used     Alcohol use Yes       ROS:  General: negative for fever  SKIN: + as above  ENDO hx hyperthyroid    Physcial Exam:  /84  Pulse 79  Temp 98.1  F (36.7  C) (Oral)  Resp 20  Ht 5' 7\" (1.702 m)  Wt 125 lb 9.6 oz (57 kg)  SpO2 100%  BMI 19.67 kg/m2    GENERAL: alert, no acute distress  EYES: conjunctival clear  RESP: Regular breathing rate  NEURO: awake .  SKIN:  Rt posterior neck with 6 cm diameter raised tender fluctaunct red lesion w/o any surrounding erythema    ASSESSMENT:we did discuss options at  This point are I&D (recommended) vs continued hot pack and topical ABXs but that if it doesn't start draining soon will need I&D. Patient is reluctant to drain today 2nd to the pain of the procedure.      ICD-10-CM    1. Abscess of neck L02.11        PLAN:  See today's orders.  Follow-up withED  if not improving.  Advised about symptoms which might herald more serious problems.      "

## 2018-10-11 NOTE — MR AVS SNAPSHOT
After Visit Summary   10/11/2018    Arcadio Adams    MRN: [id number]           Patient Information     Date Of Birth          1990        Visit Information        Provider Department      10/11/2018 4:40 PM Ganga Cardona PA [facility]        Today's Diagnoses     Abscess of neck    -  1      Care Instructions      * ABSCESS [Antibiotic treatment only]  An abscess (sometimes called a  boil ) occurs when bacteria get trapped under the skin and begin to grow. Pus forms inside the abscess as the body responds to the bacteria. An abscess can occur with an insect bite, ingrown hair, blocked oil gland, pimple, cyst, or puncture wound.  In the early stages, redness and tenderness are the only symptoms. Sometimes, this stage can be treated with antibiotics alone. If the abscess does not respond to antibiotic treatment, it will need to be drained with a small cut, under local anesthesia.  HOME CARE:      Soak the wound in hot water or apply hot packs (small towel soaked in hot water) to the area for 20 minutes at a time. Do this three to four times a day.    Apply antibiotic cream or ointment such as Bacitracin or Polysporin onto the skin 3-4 times a day, unless something else was prescribed.  Neosporin Plus  includes an antibiotic plus a local pain reliever.    Take all of the antibiotics until they are gone.    You may use acetaminophen (Tylenol) or ibuprofen (Motrin, Advil) to control pain, unless another pain medicine was prescribed. [ NOTE : If you have chronic liver or kidney disease or ever had a stomach ulcer or GI bleeding, talk with your doctor before using these any of these.]  FOLLOW UP as advised by our staff. Look at your wound each day for the signs of worsening infection listed below.  GET PROMPT MEDICAL ATTENTION if any of the following occur:    An increase in redness or swelling    Red streaks in the skin leading away from the abscess    An  increase in local pain or swelling    Fever of 100.4 F (38 C) or higher, or as directed by your healthcare provider    Pus or fluid coming from the abscess    1782-0298 [employer]. [address] [place] [address], [place] [address]. All rights reserved. This information is not intended as a substitute for professional medical care. Always follow your healthcare professional's instructions.  This information has been modified by your health care provider with permission from the publisher.            Follow-ups after your visit        Follow-up notes from your care team     Return in about 4 weeks (around 11/8/2018), or if symptoms worsen or fail to improve, for Annual Physical.      Who to contact     If you have questions or need follow up information about today's clinic visit or your schedule please contact [facility] directly at [phone].  Normal or non-critical lab and imaging results will be communicated to you by UShealthrecordhart, letter or phone within 4 business days after the clinic has received the results. If you do not hear from us within 7 days, please contact the clinic through UShealthrecordhart or phone. If you have a critical or abnormal lab result, we will notify you by phone as soon as possible.  Submit refill requests through [employer] or call your pharmacy and they will forward the refill request to us. Please allow 3 business days for your refill to be completed.          Additional Information About Your Visit        UShealthrecordhart Information     F[employer] gives you secure access to your electronic health record. If you see a primary care provider, you can also send messages to your care team and make appointments. If you have questions, please call your primary care clinic.  If you do not have a primary care provider, please call [phone] and they will assist you.        Care EveryWhere ID     This is your Care EveryWhere ID. This could be used by other organizations to access your  "[facility] medical records  [id number]        Your Vitals Were     Pulse Temperature Respirations Height Pulse Oximetry BMI (Body Mass Index)    79 98.1  F (36.7  C) (Oral) 20 5' 7\" (1.702 m) 100% 19.67 kg/m2       Blood Pressure from Last 3 Encounters:   10/11/18 132/84   09/21/18 132/72   05/03/18 158/80    Weight from Last 3 Encounters:   10/11/18 125 lb 9.6 oz (57 kg)   09/21/18 127 lb (57.6 kg)   05/03/18 130 lb (59 kg)              Today, you had the following     No orders found for display       Primary Care Provider Office Phone # Fax #    Lavell Hand [phone]       [address]  [facility] [address]        Equal Access to Services     [facility]: Hadii aad ku hadasho Soomaali, waaxda luqadaha, qaybta kaalmada adeegyada, waxay idiin hayaan wendy kharash jena . So [facility] [phone].    ATENCIÓN: Si habla español, tiene a dunbar disposición servicios gratuitos de asistencia lingüística. Llame al [phone].    We comply with applicable federal civil rights laws and [place] laws. We do not discriminate on the basis of race, color, national origin, age, disability, sex, sexual orientation, or gender identity.            Thank you!     Thank you for choosing [facility]  for your care. Our goal is always to provide you with excellent care. Hearing back from our patients is one way we can continue to improve our services. Please take a few minutes to complete the written survey that you may receive in the mail after your visit with us. Thank you!             Your Updated Medication List - Protect others around you: Learn how to safely use, store and throw away your medicines at www.disposemymeds.org.          This list is accurate as of 10/11/18  5:04 PM.  Always use your most recent med list.                   Brand Name Dispense Instructions for use Diagnosis    cephALEXin 500 MG capsule    KEFLEX    30 capsule    Take 1 capsule (500 mg) by mouth 3 times " daily    Cyst of neck

## 2018-10-11 NOTE — PATIENT INSTRUCTIONS
* ABSCESS [Antibiotic treatment only]  An abscess (sometimes called a  boil ) occurs when bacteria get trapped under the skin and begin to grow. Pus forms inside the abscess as the body responds to the bacteria. An abscess can occur with an insect bite, ingrown hair, blocked oil gland, pimple, cyst, or puncture wound.  In the early stages, redness and tenderness are the only symptoms. Sometimes, this stage can be treated with antibiotics alone. If the abscess does not respond to antibiotic treatment, it will need to be drained with a small cut, under local anesthesia.  HOME CARE:      Soak the wound in hot water or apply hot packs (small towel soaked in hot water) to the area for 20 minutes at a time. Do this three to four times a day.    Apply antibiotic cream or ointment such as Bacitracin or Polysporin onto the skin 3-4 times a day, unless something else was prescribed.  Neosporin Plus  includes an antibiotic plus a local pain reliever.    Take all of the antibiotics until they are gone.    You may use acetaminophen (Tylenol) or ibuprofen (Motrin, Advil) to control pain, unless another pain medicine was prescribed. [ NOTE : If you have chronic liver or kidney disease or ever had a stomach ulcer or GI bleeding, talk with your doctor before using these any of these.]  FOLLOW UP as advised by our staff. Look at your wound each day for the signs of worsening infection listed below.  GET PROMPT MEDICAL ATTENTION if any of the following occur:    An increase in redness or swelling    Red streaks in the skin leading away from the abscess    An increase in local pain or swelling    Fever of 100.4 F (38 C) or higher, or as directed by your healthcare provider    Pus or fluid coming from the abscess    8769-5923 [employer]. [address] [place] [address], [place] [address]. All rights reserved. This information is not intended as a substitute for professional medical care. Always follow your healthcare  professional's instructions.  This information has been modified by your health care provider with permission from the publisher.

## 2018-10-18 ENCOUNTER — OFFICE VISIT (OUTPATIENT)
Dept: FAMILY MEDICINE | Facility: CLINIC | Age: 28
End: 2018-10-18
Payer: COMMERCIAL

## 2018-10-18 VITALS
TEMPERATURE: 98.2 F | WEIGHT: 127.2 LBS | SYSTOLIC BLOOD PRESSURE: 136 MMHG | OXYGEN SATURATION: 99 % | HEART RATE: 96 BPM | DIASTOLIC BLOOD PRESSURE: 88 MMHG | BODY MASS INDEX: 19.92 KG/M2

## 2018-10-18 DIAGNOSIS — L08.9 INFECTED SEBACEOUS CYST: ICD-10-CM

## 2018-10-18 DIAGNOSIS — L02.11 ABSCESS OF NECK: Primary | ICD-10-CM

## 2018-10-18 DIAGNOSIS — L72.3 INFECTED SEBACEOUS CYST: ICD-10-CM

## 2018-10-18 PROCEDURE — 87070 CULTURE OTHR SPECIMN AEROBIC: CPT | Performed by: FAMILY MEDICINE

## 2018-10-18 PROCEDURE — 10060 I&D ABSCESS SIMPLE/SINGLE: CPT | Performed by: FAMILY MEDICINE

## 2018-10-18 PROCEDURE — 99213 OFFICE O/P EST LOW 20 MIN: CPT | Mod: 25 | Performed by: FAMILY MEDICINE

## 2018-10-18 PROCEDURE — 87186 SC STD MICRODIL/AGAR DIL: CPT | Performed by: FAMILY MEDICINE

## 2018-10-18 PROCEDURE — 87077 CULTURE AEROBIC IDENTIFY: CPT | Performed by: FAMILY MEDICINE

## 2018-10-18 RX ORDER — SULFAMETHOXAZOLE/TRIMETHOPRIM 800-160 MG
1 TABLET ORAL 2 TIMES DAILY
Qty: 20 TABLET | Refills: 0 | Status: SHIPPED | OUTPATIENT
Start: 2018-10-18 | End: 2018-10-28

## 2018-10-18 NOTE — LETTER
October 18, 2018        Arcadio Adams  [address] [address]          To whom it may concern:    RE: Arcadio Adams    Patient was seen and treated today at our clinic and missed work due to illness. Okay off work 10/18/18-10/22/18.    Please contact me for questions or concerns.      Sincerely,        Lavell Hand MD

## 2018-10-18 NOTE — MR AVS SNAPSHOT
After Visit Summary   10/18/2018    Arcadio Adams    MRN: [id number]           Patient Information     Date Of Birth          1990        Visit Information        Provider Department      10/18/2018 11:20 AM Lavell Hand MD [facility]        Today's Diagnoses     Abscess of neck    -  1    Infected sebaceous cyst          Care Instructions      Epidermoid Cyst (Sebaceous Cyst), Infected (Incision and Drainage)  You have an epidermoid cyst. This is a small, painless lump under your skin. An epidermoid cyst (often called a sebaceous cyst, epidermal cyst, or epidermal inclusion cyst) is a term most often used for 2 similar types of cysts:    Epidermoid cysts. These cysts form slowly under the skin. They can be found on most parts of the body. But they are most often found on areas with more hair such as the scalp, face, upper back, and genitals.    Pilar cysts. These are similar to epidermoid cysts. But they start from a different part of the hair follicle. They are more likely to be on the scalp.  Some general facts about these cysts:    A cyst is a sac filled with material that is often cheesy, fatty, oily, or stringy. The material inside them can be thick. Or it can be a thin liquid.    You can usually move the cyst slightly if you try.    The cysts can be smaller than a pea or as large as a few inches.    The cysts are usually not painful, unless they become inflamed or infected.    The area around the cyst may smell bad. If the cyst breaks open, the material inside it often smells bad too.  Your cyst became inflamed or infected and your healthcare provider wanted to drain it. Gauze packing may have been inserted into the cyst opening (cavity). This keeps the cyst open so it doesn t seal up before it has time to drain more. No matter how well it was cleaned out, no cleaning is perfect. The packing will need to be removed.  Once the pus is drained, antibiotics may  not be needed unless the infection has spread into the skin around the wound. The wound will take about 1 to 2 weeks to heal, depending on the size of the abscess.  Home care  The following will help you care for your wound at home:    The wound may drain for the first 2 days. Cover the opening with a clean dry bandage. If the dressing becomes soaked with blood or pus, change it.    If a gauze packing was placed inside the opening of the cyst, it will need to be removed. Your healthcare provider will usually do this after 2 days. If it falls out sooner, do not try to put it back inside the wound. Once the packing is removed, you should wash the area carefully in the shower once a day, until the skin opening has closed. This could take up to 5 days depending on the size of the cyst. It is good to direct the shower spray directly into the opening if this is not too painful.    If you were prescribed antibiotics, take them as directed until they are all used up.    You may use over-the-counter pain medicine to control pain, unless another medicine was given. If you have chronic liver or kidney disease or ever had a stomach ulcer or GI bleeding, talk with your provider before using these medicines.  Prevention  Once this infection has healed, use these prevention tips to avoid another infection:    Keep the cyst opening clean by bathing or showering daily.    Avoid tight-fitting clothing in the cyst area.    Watch for the signs of infection listed below so that treatment may be started early.  Follow-up care  Follow up with your healthcare provider, or as advised. If a gauze packing was put in your wound, it should be removed as instructed by your healthcare provider. Check your wound every day for the signs listed below.  When to seek medical advice  Call your healthcare provider right away if any of these occur:    Pus continues to come from the cyst 2 days after the incision and drainage    Increasing redness around  the wound.    Increasing local pain or swelling    Fever of 100.4 F (38 C) or higher, or as directed by your provider  Date Last Reviewed: 10/1/2016    6835-0103 [employer]. [address] [place] [address], [place] [address]. All rights reserved. This information is not intended as a substitute for professional medical care. Always follow your healthcare professional's instructions.                Follow-ups after your visit        Follow-up notes from your care team     Return in 4 days (on 10/22/2018) for recheck abscess.      Who to contact     If you have questions or need follow up information about today's clinic visit or your schedule please contact [facility] directly at [phone].  Normal or non-critical lab and imaging results will be communicated to you by MyChart, letter or phone within 4 business days after the clinic has received the results. If you do not hear from us within 7 days, please contact the clinic through Process Data Controlhart or phone. If you have a critical or abnormal lab result, we will notify you by phone as soon as possible.  Submit refill requests through [employer] or call your pharmacy and they will forward the refill request to us. Please allow 3 business days for your refill to be completed.          Additional Information About Your Visit        [employer] Data Control[employer] Information     [employer] gives you secure access to your electronic health record. If you see a primary care provider, you can also send messages to your care team and make appointments. If you have questions, please call your primary care clinic.  If you do not have a primary care provider, please call [phone] and they will assist you.        Care EveryWhere ID     This is your Care EveryWhere ID. This could be used by other organizations to access your [facility] medical records  [id number]        Your Vitals Were     Pulse Temperature Pulse Oximetry BMI (Body Mass Index)          96 98.2  F (36.8  C) (Oral) 99%  19.92 kg/m2         Blood Pressure from Last 3 Encounters:   10/18/18 136/88   10/11/18 132/84   09/21/18 132/72    Weight from Last 3 Encounters:   10/18/18 57.7 kg (127 lb 3.2 oz)   10/11/18 57 kg (125 lb 9.6 oz)   09/21/18 57.6 kg (127 lb)              We Performed the Following     DRAIN SKIN ABSCESS SIMPLE/SINGLE     Skin Culture Aerobic Bacterial          Today's Medication Changes          These changes are accurate as of 10/18/18 12:34 PM.  If you have any questions, ask your nurse or doctor.               Start taking these medicines.        Dose/Directions    sulfamethoxazole-trimethoprim 800-160 MG per tablet   Commonly known as:  BACTRIM DS/SEPTRA DS   Used for:  Abscess of neck, Infected sebaceous cyst   Started by:  Lavell Hand MD        Dose:  1 tablet   Take 1 tablet by mouth 2 times daily for 10 days for skin infection.   Quantity:  20 tablet   Refills:  0            Where to get your medicines      These medications were sent to [id number] - [place], [place] - [address]  [address] [facility] [address]     Phone:  [phone]     sulfamethoxazole-trimethoprim 800-160 MG per tablet                Primary Care Provider Office Phone # Fax #    Lavell Hand [phone]       [address]  [facility] [address]        Equal Access to Services     [facility]: Hadii aad ku hadasho Soomaali, waaxda luqadaha, qaybta kaalmada adeegyada, hair brunoin haygetachew kleinarajeremy bella . So [facility] [phone].    ATENCIÓN: Si habla español, tiene a dunbar disposición servicios gratuitos de asistencia lingüística. Llame al [phone].    We comply with applicable federal civil rights laws and [place] laws. We do not discriminate on the basis of race, color, national origin, age, disability, sex, sexual orientation, or gender identity.            Thank you!     Thank you for choosing [facility]  for your care. Our goal is always to provide you  with excellent care. Hearing back from our patients is one way we can continue to improve our services. Please take a few minutes to complete the written survey that you may receive in the mail after your visit with us. Thank you!             Your Updated Medication List - Protect others around you: Learn how to safely use, store and throw away your medicines at www.disposemymeds.org.          This list is accurate as of 10/18/18 12:34 PM.  Always use your most recent med list.                   Brand Name Dispense Instructions for use Diagnosis    cephALEXin 500 MG capsule    KEFLEX    30 capsule    Take 1 capsule (500 mg) by mouth 3 times daily    Cyst of neck       sulfamethoxazole-trimethoprim 800-160 MG per tablet    BACTRIM DS/SEPTRA DS    20 tablet    Take 1 tablet by mouth 2 times daily for 10 days for skin infection.    Abscess of neck, Infected sebaceous cyst

## 2018-10-18 NOTE — PATIENT INSTRUCTIONS
Epidermoid Cyst (Sebaceous Cyst), Infected (Incision and Drainage)  You have an epidermoid cyst. This is a small, painless lump under your skin. An epidermoid cyst (often called a sebaceous cyst, epidermal cyst, or epidermal inclusion cyst) is a term most often used for 2 similar types of cysts:    Epidermoid cysts. These cysts form slowly under the skin. They can be found on most parts of the body. But they are most often found on areas with more hair such as the scalp, face, upper back, and genitals.    Pilar cysts. These are similar to epidermoid cysts. But they start from a different part of the hair follicle. They are more likely to be on the scalp.  Some general facts about these cysts:    A cyst is a sac filled with material that is often cheesy, fatty, oily, or stringy. The material inside them can be thick. Or it can be a thin liquid.    You can usually move the cyst slightly if you try.    The cysts can be smaller than a pea or as large as a few inches.    The cysts are usually not painful, unless they become inflamed or infected.    The area around the cyst may smell bad. If the cyst breaks open, the material inside it often smells bad too.  Your cyst became inflamed or infected and your healthcare provider wanted to drain it. Gauze packing may have been inserted into the cyst opening (cavity). This keeps the cyst open so it doesn t seal up before it has time to drain more. No matter how well it was cleaned out, no cleaning is perfect. The packing will need to be removed.  Once the pus is drained, antibiotics may not be needed unless the infection has spread into the skin around the wound. The wound will take about 1 to 2 weeks to heal, depending on the size of the abscess.  Home care  The following will help you care for your wound at home:    The wound may drain for the first 2 days. Cover the opening with a clean dry bandage. If the dressing becomes soaked with blood or pus, change it.    If a gauze  packing was placed inside the opening of the cyst, it will need to be removed. Your healthcare provider will usually do this after 2 days. If it falls out sooner, do not try to put it back inside the wound. Once the packing is removed, you should wash the area carefully in the shower once a day, until the skin opening has closed. This could take up to 5 days depending on the size of the cyst. It is good to direct the shower spray directly into the opening if this is not too painful.    If you were prescribed antibiotics, take them as directed until they are all used up.    You may use over-the-counter pain medicine to control pain, unless another medicine was given. If you have chronic liver or kidney disease or ever had a stomach ulcer or GI bleeding, talk with your provider before using these medicines.  Prevention  Once this infection has healed, use these prevention tips to avoid another infection:    Keep the cyst opening clean by bathing or showering daily.    Avoid tight-fitting clothing in the cyst area.    Watch for the signs of infection listed below so that treatment may be started early.  Follow-up care  Follow up with your healthcare provider, or as advised. If a gauze packing was put in your wound, it should be removed as instructed by your healthcare provider. Check your wound every day for the signs listed below.  When to seek medical advice  Call your healthcare provider right away if any of these occur:    Pus continues to come from the cyst 2 days after the incision and drainage    Increasing redness around the wound.    Increasing local pain or swelling    Fever of 100.4 F (38 C) or higher, or as directed by your provider  Date Last Reviewed: 10/1/2016    1410-3512 [employer]. [address] [place] [address], [place] [address]. All rights reserved. This information is not intended as a substitute for professional medical care. Always follow your healthcare professional's instructions.

## 2018-10-18 NOTE — PROGRESS NOTES
SUBJECTIVE:   Arcadio Adams is a 28 year old male who presents to clinic today for the following health issues:    Rash/Cyst  Onset: cyst has been present for years but became infected approximately 9/18/18 - was seen in clinic 9/21/18 and in  10/11/18    Description:   Location: right side of neck  Character: raised, painful, red  Itching (Pruritis): no     Progression of Symptoms:  worsening    Accompanying Signs & Symptoms:  Fever: no   Body aches or joint pain: no   Sore throat symptoms: no   Recent cold symptoms: no     History:   Previous similar rash/cyst: YES- reoccurring issue    Precipitating factors:   Exposure to similar rash: no   New exposures: None   Recent travel: no   Therapies Tried and outcome: cephalexin- didn't help, antibacterial neosporin- didn't help. Would like to see if it can be drained.    Past medical, family, and social histories, medications, and allergies are reviewed and updated in Epic.     ROS:  CONSTITUTIONAL: NEGATIVE for fever, chills, change in weight  ENT/MOUTH: NEGATIVE for ear, mouth and throat problems  RESP: NEGATIVE for significant cough or SOB  CV: NEGATIVE for chest pain, palpitations or peripheral edema  ROS otherwise negative    This document serves as a record of the services and decisions personally performed and made by Dr. Hand. It was created on his behalf by Sylvain Light, a trained medical scribe. The creation of this document is based the provider's statements to the medical scribe.  Sylvain Light October 18, 2018 11:47 AM     OBJECTIVE:                                                    /88 (BP Location: Left arm, Patient Position: Chair, Cuff Size: Adult Regular)  Pulse 96  Temp 98.2  F (36.8  C) (Oral)  Wt 57.7 kg (127 lb 3.2 oz)  SpO2 99%  BMI 19.92 kg/m2   Body mass index is 19.92 kg/(m^2).   GENERAL: healthy, alert and no distress  EYES: Eyes grossly normal to inspection, PERRL, EOMI, sclerae white and conjunctivae normal  MS: no  gross musculoskeletal defects noted, no edema  SKIN: on the right posterior neck there is an abscess that appears to be about roughly 5x5 cm with an associated pore anteriorly consistent with a history of a chronic sebaceous cyst  NEURO: Normal strength and tone, sensory exam grossly normal, mentation intact, oriented times 3 and cranial nerves 2-12 intact  PSYCH: mentation appears normal, affect normal/bright     Diagnostic Test Results:  none      ASSESSMENT/PLAN:                                                      (L02.11) Abscess of neck  (primary encounter diagnosis)  (L72.3,  L08.9) Infected sebaceous cyst  Comment: no significant response to oral antibiotics only (keflex)  Plan: DRAIN SKIN ABSCESS SIMPLE/SINGLE, Skin Culture         Aerobic Bacterial,         sulfamethoxazole-trimethoprim (BACTRIM         DS/SEPTRA DS) 800-160 MG per tablet        The abscess is cleansed with alcohol, anesthetized with Lidocaine 1% with epinephrine, and prepped with iodine solution. The area is draped sterily. The abscess is drained using 15 blade scalpel making a 12 mm incision. I was able to express at least 5 cc of bloody purulent material which did contain some keratinased material consistent with components of the sebaceous cyst. The defect is irrigated with 30 cc normal sterile saline. Antibiotic ointment and bandage applied. Wound care discussed with the patient during the procedure. Handouts provided. Please see the work note filed in the Letters section. Return in 4 days (10/22/2018) for recheck.      The information in this document, created by the medical scribe for me, accurately reflects the services I personally performed and the decisions made by me. I have reviewed and approved this document for accuracy prior to leaving the patient care area. October 18, 2018 11:47 AM     Lavell Hand MD

## 2018-10-22 ENCOUNTER — OFFICE VISIT (OUTPATIENT)
Dept: FAMILY MEDICINE | Facility: CLINIC | Age: 28
End: 2018-10-22
Payer: COMMERCIAL

## 2018-10-22 VITALS
SYSTOLIC BLOOD PRESSURE: 131 MMHG | HEART RATE: 76 BPM | WEIGHT: 125 LBS | BODY MASS INDEX: 19.62 KG/M2 | OXYGEN SATURATION: 100 % | TEMPERATURE: 98.2 F | DIASTOLIC BLOOD PRESSURE: 84 MMHG | HEIGHT: 67 IN

## 2018-10-22 DIAGNOSIS — L76.33 POSTOPERATIVE SEROMA OF SKIN AFTER DERMATOLOGIC PROCEDURE: ICD-10-CM

## 2018-10-22 DIAGNOSIS — L02.11 ABSCESS OF NECK: Primary | ICD-10-CM

## 2018-10-22 PROCEDURE — 10060 I&D ABSCESS SIMPLE/SINGLE: CPT | Performed by: FAMILY MEDICINE

## 2018-10-22 PROCEDURE — 99207 ZZC FOR CODING REVIEW: CPT | Performed by: FAMILY MEDICINE

## 2018-10-22 ASSESSMENT — PAIN SCALES - GENERAL: PAINLEVEL: NO PAIN (0)

## 2018-10-22 NOTE — PROGRESS NOTES
"  SUBJECTIVE:   Arcadio Adams is a 28 year old male who presents to clinic today for the following health issues:    Concern -Recheck  Cyst     When did it start?: infection started approximately 9/18/18    Any strain/injury, other illness, or event to trigger this problem?   no    Previous history of similar problem:   YES      Location:           neck    Describe it:   Cyst on right side of neck, drained on 10/18/18 at clinic - now some mild erythema but symptoms mostly improved    Severity: mild      Progression of symptoms from onset until now:  improving      Accompanying Signs & Symptoms: some bloody drainage   What have you noticed that tends to make this worse?  None      What have you noticed that tends to make this better?  Draining cyst      What have you done (this time) to try to treat this problem yourself?  bactrim      Did it help?  YES - denies side effects from antibiotic           Past medical, family, and social histories, medications, and allergies are reviewed and updated in Epic.     ROS:  CONSTITUTIONAL: NEGATIVE for fever, chills, change in weight  ENT/MOUTH: NEGATIVE for ear, mouth and throat problems  RESP: NEGATIVE for significant cough or SOB  CV: NEGATIVE for chest pain, palpitations or peripheral edema  ROS otherwise negative    This document serves as a record of the services and decisions personally performed and made by Dr. Hand. It was created on his behalf by Sylvain Light, a trained medical scribe. The creation of this document is based the provider's statements to the medical scribe.  Sylvain Light October 22, 2018 9:55 AM     OBJECTIVE:                                                    /84  Pulse 76  Temp 98.2  F (36.8  C) (Oral)  Ht 1.702 m (5' 7\")  Wt 56.7 kg (125 lb)  SpO2 100%  BMI 19.58 kg/m2   Body mass index is 19.58 kg/(m^2).   GENERAL: healthy, alert and no distress  EYES: Eyes grossly normal to inspection, PERRL, EOMI, sclerae white and conjunctivae " normal  MS: no gross musculoskeletal defects noted, no edema  SKIN: some fluid has reaccumulated where his abscess was last week. It is not as large as on initial presentation. No erythema or warmth. No tenderness unless deeply palpated.  NEURO: Normal strength and tone, sensory exam grossly normal, mentation intact, oriented times 3 and cranial nerves 2-12 intact  PSYCH: mentation appears normal, affect normal/bright     Diagnostic Test Results:   none      ASSESSMENT/PLAN:                                                      (L02.11) Abscess of neck  (primary encounter diagnosis)  Comment: resolving appropriately from an infection standpoint  Plan: DRAIN SKIN ABSCESS SIMPLE/SINGLE        The lesion is cleansed with alcohol, anesthetized with Lidocaine 1% with epinephrine, and prepped with iodine solution. The lesion is incised using an 11-blade scalpel making a 7 mm incision inferiorly, which allows me to drain slightly bloody serous fluid from the space. No evidence for purulence. A pressure bandage applied. Wound care discussed with the patient during the procedure. If fluid does not reaccumulate, he only needs to complete the antibiotics and no follow up visit is needed.    (L76.33) Postoperative seroma of skin after dermatologic procedure  Comment:   Plan: OTOLARYNGOLOGY REFERRAL        If fluid reaccumulates, I want him to meet with ENT to manage this.           The information in this document, created by the medical scribe for me, accurately reflects the services I personally performed and the decisions made by me. I have reviewed and approved this document for accuracy prior to leaving the patient care area. October 22, 2018 9:55 AM   Lavell Hand MD

## 2018-10-22 NOTE — MR AVS SNAPSHOT
After Visit Summary   10/22/2018    Arcadio Adams    MRN: [id number]           Patient Information     Date Of Birth          1990        Visit Information        Provider Department      10/22/2018 9:20 AM Lavell Hand MD [facility]        Today's Diagnoses     Abscess of neck    -  1    Postoperative seroma of skin after dermatologic procedure          Care Instructions    At [facility], we strive to deliver an exceptional experience to you, every time we see you.  If you receive a survey in the mail, please send us back your thoughts. We really do value your feedback.    Your care team:                            Family Medicine Internal Medicine   MD Yaya Cervantes MD Shantel Branch-Fleming, MD Katya Georgiev PA-C Megan Hill, APRN [facility]    Arsenio Mai MD Pediatrics   Armando Cardona, MATTHIAS Galicia, MD Racquel Patel APRN CNP   MD Kelley Rutherford MD Deborah Mielke, MD Kim Thein, APR[facility]      Clinic hours: Monday - Thursday 7 am-7 pm; Fridays 7 am-5 pm.   Urgent care: Monday - Friday 11 am-9 pm; Saturday and Sunday 9 am-5 pm.  Pharmacy : Monday -Thursday 8 am-8 pm; Friday 8 am-6 pm; Saturday and Sunday 9 am-5 pm.     Clinic: [phone]   Pharmacy: [phone]                Follow-ups after your visit        Additional Services     OTOLARYNGOLOGY REFERRAL       Your provider has referred you to:     [facility] [phone]   [email or web address]  [facility] [phone]   [email or web address]  [facility] [phone]   [email or web address]  [facility] [phone]   [email or web address]    Please be aware that coverage of these services is subject to the terms and limitations  "of your health insurance plan.  Call member services at your health plan with any benefit or coverage questions.      Please bring the following with you to your appointment:    (1) Any X-Rays, CTs or MRIs which have been performed.  Contact the facility where they were done to arrange for  prior to your scheduled appointment.   (2) List of current medications  (3) This referral request   (4) Any documents/labs given to you for this referral                  Who to contact     If you have questions or need follow up information about today's clinic visit or your schedule please contact [facility] directly at [phone].  Normal or non-critical lab and imaging results will be communicated to you by MyChart, letter or phone within 4 business days after the clinic has received the results. If you do not hear from us within 7 days, please contact the clinic through [employer] or phone. If you have a critical or abnormal lab result, we will notify you by phone as soon as possible.  Submit refill requests through [employer] or call your pharmacy and they will forward the refill request to us. Please allow 3 business days for your refill to be completed.          Additional Information About Your Visit        [employer] Information     [employer] gives you secure access to your electronic health record. If you see a primary care provider, you can also send messages to your care team and make appointments. If you have questions, please call your primary care clinic.  If you do not have a primary care provider, please call [phone] and they will assist you.        Care EveryWhere ID     This is your Care EveryWhere ID. This could be used by other organizations to access your [place] medical records  [id number]        Your Vitals Were     Pulse Temperature Height Pulse Oximetry BMI (Body Mass Index)       76 98.2  F (36.8  C) (Oral) 5' 7\" (1.702 m) 100% 19.58 kg/m2        Blood Pressure from Last 3 " Encounters:   10/22/18 131/84   10/18/18 136/88   10/11/18 132/84    Weight from Last 3 Encounters:   10/22/18 125 lb (56.7 kg)   10/18/18 127 lb 3.2 oz (57.7 kg)   10/11/18 125 lb 9.6 oz (57 kg)              We Performed the Following     DRAIN SKIN ABSCESS SIMPLE/SINGLE     OTOLARYNGOLOGY REFERRAL        Primary Care Provider Office Phone # Fax #    Lavell Hand [phone]       [address]  [facility] [address]        Equal Access to Services     [facility]: Hadii aad ku hadasho Soomaali, waaxda luqadaha, qaybta kaalmada adeegyada, waxay idiin hayaan adeeg adriane bella . So [facility] [phone].    ATENCIÓN: Si habla español, tiene a dunbar disposición servicios gratuitos de asistencia lingüística. Llame al [phone].    We comply with applicable federal civil rights laws and [place] laws. We do not discriminate on the basis of race, color, national origin, age, disability, sex, sexual orientation, or gender identity.            Thank you!     Thank you for choosing [facility]  for your care. Our goal is always to provide you with excellent care. Hearing back from our patients is one way we can continue to improve our services. Please take a few minutes to complete the written survey that you may receive in the mail after your visit with us. Thank you!             Your Updated Medication List - Protect others around you: Learn how to safely use, store and throw away your medicines at www.disposemymeds.org.          This list is accurate as of 10/22/18 10:23 AM.  Always use your most recent med list.                   Brand Name Dispense Instructions for use Diagnosis    sulfamethoxazole-trimethoprim 800-160 MG per tablet    BACTRIM DS/SEPTRA DS    20 tablet    Take 1 tablet by mouth 2 times daily for 10 days for skin infection.    Abscess of neck, Infected sebaceous cyst

## 2018-10-22 NOTE — PATIENT INSTRUCTIONS
At [facility], we strive to deliver an exceptional experience to you, every time we see you.  If you receive a survey in the mail, please send us back your thoughts. We really do value your feedback.    Your care team:                            Family Medicine Internal Medicine   MD Yaya Cervantes MD Shantel Branch-Fleming, MD Katya Georgiev PA-C Megan Hill, APRN DESTINI Mia MD Pediatrics   Armando Cardona, MATTHIAS Galicia, MD Racquel Patel APRN CNP   MD Kelley Rutherford MD Deborah Mielke, MD Connie Brown, APRN [facility]      Clinic hours: Monday - Thursday 7 am-7 pm; Fridays 7 am-5 pm.   Urgent care: Monday - Friday 11 am-9 pm; Saturday and Sunday 9 am-5 pm.  Pharmacy : Monday -Thursday 8 am-8 pm; Friday 8 am-6 pm; Saturday and Sunday 9 am-5 pm.     Clinic: [phone]   Pharmacy: [phone]

## 2018-10-23 LAB
BACTERIA SPEC CULT: ABNORMAL
SPECIMEN SOURCE: ABNORMAL

## 2018-12-14 ENCOUNTER — OFFICE VISIT (OUTPATIENT)
Dept: FAMILY MEDICINE | Facility: CLINIC | Age: 28
End: 2018-12-14
Payer: COMMERCIAL

## 2018-12-14 VITALS
HEIGHT: 67 IN | RESPIRATION RATE: 20 BRPM | HEART RATE: 110 BPM | TEMPERATURE: 98.2 F | DIASTOLIC BLOOD PRESSURE: 94 MMHG | BODY MASS INDEX: 19.62 KG/M2 | WEIGHT: 125 LBS | OXYGEN SATURATION: 99 % | SYSTOLIC BLOOD PRESSURE: 142 MMHG

## 2018-12-14 DIAGNOSIS — E05.90 PRIMARY HYPERTHYROIDISM: Primary | ICD-10-CM

## 2018-12-14 DIAGNOSIS — R94.31 ABNORMAL ELECTROCARDIOGRAM: ICD-10-CM

## 2018-12-14 DIAGNOSIS — R03.0 ELEVATED BP WITHOUT DIAGNOSIS OF HYPERTENSION: ICD-10-CM

## 2018-12-14 DIAGNOSIS — R00.2 PALPITATIONS: ICD-10-CM

## 2018-12-14 DIAGNOSIS — R07.89 CHEST TIGHTNESS OR PRESSURE: ICD-10-CM

## 2018-12-14 LAB
ALBUMIN SERPL-MCNC: 4.5 G/DL (ref 3.4–5)
ALP SERPL-CCNC: 90 U/L (ref 40–150)
ALT SERPL W P-5'-P-CCNC: 24 U/L (ref 0–70)
ANION GAP SERPL CALCULATED.3IONS-SCNC: 8 MMOL/L (ref 3–14)
AST SERPL W P-5'-P-CCNC: 27 U/L (ref 0–45)
BASOPHILS # BLD AUTO: 0 10E9/L (ref 0–0.2)
BASOPHILS NFR BLD AUTO: 0.2 %
BILIRUB SERPL-MCNC: 0.7 MG/DL (ref 0.2–1.3)
BUN SERPL-MCNC: 16 MG/DL (ref 7–30)
CALCIUM SERPL-MCNC: 9.2 MG/DL (ref 8.5–10.1)
CHLORIDE SERPL-SCNC: 104 MMOL/L (ref 94–109)
CO2 SERPL-SCNC: 28 MMOL/L (ref 20–32)
CREAT SERPL-MCNC: 1.02 MG/DL (ref 0.66–1.25)
DIFFERENTIAL METHOD BLD: NORMAL
EOSINOPHIL # BLD AUTO: 0.1 10E9/L (ref 0–0.7)
EOSINOPHIL NFR BLD AUTO: 1.2 %
ERYTHROCYTE [DISTWIDTH] IN BLOOD BY AUTOMATED COUNT: 12.8 % (ref 10–15)
GFR SERPL CREATININE-BSD FRML MDRD: 87 ML/MIN/1.7M2
GLUCOSE SERPL-MCNC: 101 MG/DL (ref 70–99)
HCT VFR BLD AUTO: 45.1 % (ref 40–53)
HGB BLD-MCNC: 15.4 G/DL (ref 13.3–17.7)
LYMPHOCYTES # BLD AUTO: 1.9 10E9/L (ref 0.8–5.3)
LYMPHOCYTES NFR BLD AUTO: 38.7 %
MCH RBC QN AUTO: 30.2 PG (ref 26.5–33)
MCHC RBC AUTO-ENTMCNC: 34.1 G/DL (ref 31.5–36.5)
MCV RBC AUTO: 88 FL (ref 78–100)
MONOCYTES # BLD AUTO: 0.5 10E9/L (ref 0–1.3)
MONOCYTES NFR BLD AUTO: 9.7 %
NEUTROPHILS # BLD AUTO: 2.5 10E9/L (ref 1.6–8.3)
NEUTROPHILS NFR BLD AUTO: 50.2 %
PLATELET # BLD AUTO: 240 10E9/L (ref 150–450)
POTASSIUM SERPL-SCNC: 3.9 MMOL/L (ref 3.4–5.3)
PROT SERPL-MCNC: 8.2 G/DL (ref 6.8–8.8)
RBC # BLD AUTO: 5.1 10E12/L (ref 4.4–5.9)
SODIUM SERPL-SCNC: 140 MMOL/L (ref 133–144)
T3FREE SERPL-MCNC: 3.2 PG/ML (ref 2.3–4.2)
TSH SERPL DL<=0.005 MIU/L-ACNC: 0.7 MU/L (ref 0.4–4)
WBC # BLD AUTO: 5 10E9/L (ref 4–11)

## 2018-12-14 PROCEDURE — 85025 COMPLETE CBC W/AUTO DIFF WBC: CPT | Performed by: NURSE PRACTITIONER

## 2018-12-14 PROCEDURE — 36415 COLL VENOUS BLD VENIPUNCTURE: CPT | Performed by: NURSE PRACTITIONER

## 2018-12-14 PROCEDURE — 99214 OFFICE O/P EST MOD 30 MIN: CPT | Performed by: NURSE PRACTITIONER

## 2018-12-14 PROCEDURE — 80053 COMPREHEN METABOLIC PANEL: CPT | Performed by: NURSE PRACTITIONER

## 2018-12-14 PROCEDURE — 93000 ELECTROCARDIOGRAM COMPLETE: CPT | Performed by: NURSE PRACTITIONER

## 2018-12-14 PROCEDURE — 84481 FREE ASSAY (FT-3): CPT | Performed by: NURSE PRACTITIONER

## 2018-12-14 PROCEDURE — 84443 ASSAY THYROID STIM HORMONE: CPT | Performed by: NURSE PRACTITIONER

## 2018-12-14 ASSESSMENT — MIFFLIN-ST. JEOR: SCORE: 1500.63

## 2018-12-14 ASSESSMENT — ANXIETY QUESTIONNAIRES
IF YOU CHECKED OFF ANY PROBLEMS ON THIS QUESTIONNAIRE, HOW DIFFICULT HAVE THESE PROBLEMS MADE IT FOR YOU TO DO YOUR WORK, TAKE CARE OF THINGS AT HOME, OR GET ALONG WITH OTHER PEOPLE: NOT DIFFICULT AT ALL
3. WORRYING TOO MUCH ABOUT DIFFERENT THINGS: NOT AT ALL
6. BECOMING EASILY ANNOYED OR IRRITABLE: NOT AT ALL
1. FEELING NERVOUS, ANXIOUS, OR ON EDGE: SEVERAL DAYS
2. NOT BEING ABLE TO STOP OR CONTROL WORRYING: NOT AT ALL
GAD7 TOTAL SCORE: 2
7. FEELING AFRAID AS IF SOMETHING AWFUL MIGHT HAPPEN: NOT AT ALL
5. BEING SO RESTLESS THAT IT IS HARD TO SIT STILL: SEVERAL DAYS

## 2018-12-14 ASSESSMENT — PATIENT HEALTH QUESTIONNAIRE - PHQ9
SUM OF ALL RESPONSES TO PHQ QUESTIONS 1-9: 2
5. POOR APPETITE OR OVEREATING: NOT AT ALL

## 2018-12-14 NOTE — PROGRESS NOTES
SUBJECTIVE:   Arcadio Adams is a 28 year old male who presents to clinic today for the following health issues:      Hypothyroidism Follow-up      Since last visit, patient describes the following symptoms: Weight stable, no hair loss, no skin changes, no constipation, no loose stools, anxiety and fatigue. Concern about hypertension.        Amount of exercise or physical activity: None    Problems taking medications regularly: No    Medication side effects: not applicable    Diet: regular (no restrictions)    Saw endocrine (outside provider) after hyperthyroidism was diagnosed in 2014.  Was on methimazole for 6 months in 2014 then stopped.  Symptoms normalized and subsequent TSH levels were normal.  Now feels symptoms returning (heart racing)    Little lightheadedness, and some chest pressure  Not worse with activity  Some palpitations over last week  Headache over last week or so.    No other symptoms.      Problem list and histories reviewed & adjusted, as indicated.  Additional history: as documented    Patient Active Problem List   Diagnosis     Acne vulgaris     CARDIOVASCULAR SCREENING; LDL GOAL LESS THAN 160     Primary hyperthyroidism     Vitreous syneresis of right eye     Allergic rhinitis due to dust mite     Chronic seasonal allergic rhinitis due to pollen     Past Surgical History:   Procedure Laterality Date     ENT SURGERY Right 10-20-16    I & D Neck abscess. Dr. Chen     EXTRACTION(S) DENTAL      wisdom     PE TUBES  age 6       Social History     Tobacco Use     Smoking status: Never Smoker     Smokeless tobacco: Never Used   Substance Use Topics     Alcohol use: Yes     Family History   Problem Relation Age of Onset     Hypertension Mother      Myocardial Infarction Maternal Uncle 54     C.A.D. No family hx of      Cerebrovascular Disease No family hx of      Glaucoma No family hx of      Macular Degeneration No family hx of      Anesthesia Reaction No family hx of      Thrombophilia  "No family hx of      Bleeding Disorder No family hx of      Coronary Artery Disease No family hx of      Diabetes No family hx of      Cancer No family hx of          No current outpatient medications on file.     BP Readings from Last 3 Encounters:   12/14/18 (!) 142/94   10/22/18 131/84   10/18/18 136/88    Wt Readings from Last 3 Encounters:   12/14/18 56.7 kg (125 lb)   10/22/18 56.7 kg (125 lb)   10/18/18 57.7 kg (127 lb 3.2 oz)                    Reviewed and updated as needed this visit by clinical staff  Tobacco  Allergies  Meds  Med Hx  Surg Hx  Fam Hx  Soc Hx      Reviewed and updated as needed this visit by Provider  Tobacco  Allergies  Meds  Med Hx  Surg Hx  Fam Hx  Soc Hx        ROS:  Constitutional, HEENT, cardiovascular, pulmonary, GI, , musculoskeletal, neuro, skin, endocrine and psych systems are negative, except as otherwise noted.    OBJECTIVE:     BP (!) 142/94 (BP Location: Left arm)   Pulse 110   Temp 98.2  F (36.8  C) (Oral)   Resp 20   Ht 1.702 m (5' 7\")   Wt 56.7 kg (125 lb)   SpO2 99%   BMI 19.58 kg/m    Body mass index is 19.58 kg/m .  GENERAL: healthy, alert and no distress  EYES: Eyes grossly normal to inspection, PERRL and conjunctivae and sclerae normal  HENT: ear canals and TM's normal, nose and mouth without ulcers or lesions  NECK: no adenopathy, no asymmetry, masses, or scars and thyroid normal to palpation  RESP: lungs clear to auscultation - no rales, rhonchi or wheezes  CV: regular rate and rhythm, normal S1 S2, no S3 or S4, no murmur, click or rub, no peripheral edema and peripheral pulses strong  ABDOMEN: soft, nontender, no hepatosplenomegaly, no masses and bowel sounds normal  MS: no gross musculoskeletal defects noted, no edema  PSYCH: mentation appears normal, affect normal/bright  NEURO:  Slightly hyperrreflexic in LE, upper extremities normal    Diagnostic Test Results:  Results for orders placed or performed in visit on 12/14/18 (from the past 24 " hour(s))   CBC with platelets and differential   Result Value Ref Range    WBC 5.0 4.0 - 11.0 10e9/L    RBC Count 5.10 4.4 - 5.9 10e12/L    Hemoglobin 15.4 13.3 - 17.7 g/dL    Hematocrit 45.1 40.0 - 53.0 %    MCV 88 78 - 100 fl    MCH 30.2 26.5 - 33.0 pg    MCHC 34.1 31.5 - 36.5 g/dL    RDW 12.8 10.0 - 15.0 %    Platelet Count 240 150 - 450 10e9/L    % Neutrophils 50.2 %    % Lymphocytes 38.7 %    % Monocytes 9.7 %    % Eosinophils 1.2 %    % Basophils 0.2 %    Absolute Neutrophil 2.5 1.6 - 8.3 10e9/L    Absolute Lymphocytes 1.9 0.8 - 5.3 10e9/L    Absolute Monocytes 0.5 0.0 - 1.3 10e9/L    Absolute Eosinophils 0.1 0.0 - 0.7 10e9/L    Absolute Basophils 0.0 0.0 - 0.2 10e9/L    Diff Method Automated Method        ASSESSMENT/PLAN:     1. Primary hyperthyroidism  Rechecking today given recurrence of symptoms.  Plan pending results.    - TSH with free T4 reflex  - T3, Free    2. Chest tightness or pressure  EKG without acute changes but does show possible R atrial enlargement.  Will do echo to rule out structural abnormalities.  emergency room precautions discussed.  - EKG 12-lead complete w/read - Clinics  - TSH with free T4 reflex    3. Palpitations  As above.    - TSH with free T4 reflex  - CBC with platelets and differential  - Comprehensive metabolic panel    4. Elevated BP without diagnosis of hypertension  Has had high normal blood pressure in past.  Now elevated.  If TSH elevated again, will have patient start propranolol.  If normal, patient to return for recheck in one week.  Will make plan at that time if still elevated.    - Comprehensive metabolic panel    5. Abnormal electrocardiogram  As above.   - Echocardiogram Complete; Future    Patient Instructions   For the blood pressure:  I would like to see what an echocardiogram shows as your EKG shows you may have an enlarged area of your heart.    Let's also check your blood work as we discussed today for your thyroid and anemia.      Follow up in 1 week to  recheck your blood pressure.  If symptoms are persisting, we can consider starting you on a medication to lower that and your heart rate (called propranolol).  This would also help with the thyroid if that is elevated.        JUDY Kathleen [facility]

## 2018-12-14 NOTE — PATIENT INSTRUCTIONS
For the blood pressure:  I would like to see what an echocardiogram shows as your EKG shows you may have an enlarged area of your heart.    Let's also check your blood work as we discussed today for your thyroid and anemia.      Follow up in 1 week to recheck your blood pressure.  If symptoms are persisting, we can consider starting you on a medication to lower that and your heart rate (called propranolol).  This would also help with the thyroid if that is elevated.

## 2018-12-15 ASSESSMENT — ANXIETY QUESTIONNAIRES: GAD7 TOTAL SCORE: 2

## 2018-12-17 ENCOUNTER — TELEPHONE (OUTPATIENT)
Dept: FAMILY MEDICINE | Facility: CLINIC | Age: 28
End: 2018-12-17

## 2018-12-17 NOTE — TELEPHONE ENCOUNTER
Per chart review pt view [employer] message. Closing encounter. ESSIE Tarango      Viewed by Vishal Adams on 12/17/2018  1:22 PM   Written by Margret Galicia APRN CNP on 12/17/2018  1:10 PM   Damon Ervin,   I wanted to let you know that your labs are all normal.  Let's continue to plan on doing the echocardiogram to evaluate the heart and go from there.   Please let us know if you have any questions.   Thank you!

## 2018-12-17 NOTE — TELEPHONE ENCOUNTER
Reason for Call:  Request for results:    Name of test or procedure: Labs (thyroid)    Date of test of procedure: 12/14    Location of the test or procedure: [facility]    OK to leave the result message on voice mail or with a family member? YES    Phone number Patient can be reached at:  Cell number on file:    Telephone Information:   Mobile [phone]     Additional comments: any    Call taken on 12/17/2018 at 12:02 PM by Adilene Cason

## 2018-12-20 ENCOUNTER — ANCILLARY PROCEDURE (OUTPATIENT)
Dept: CARDIOLOGY | Facility: CLINIC | Age: 28
End: 2018-12-20
Attending: NURSE PRACTITIONER
Payer: COMMERCIAL

## 2018-12-20 DIAGNOSIS — R94.31 ABNORMAL ELECTROCARDIOGRAM: ICD-10-CM

## 2018-12-20 PROCEDURE — 93306 TTE W/DOPPLER COMPLETE: CPT | Performed by: INTERNAL MEDICINE

## 2018-12-21 DIAGNOSIS — Z86.39 HISTORY OF HYPERTHYROIDISM: Primary | ICD-10-CM

## 2019-05-03 ENCOUNTER — OFFICE VISIT (OUTPATIENT)
Dept: FAMILY MEDICINE | Facility: CLINIC | Age: 29
End: 2019-05-03
Payer: COMMERCIAL

## 2019-05-03 VITALS
TEMPERATURE: 98.2 F | SYSTOLIC BLOOD PRESSURE: 129 MMHG | DIASTOLIC BLOOD PRESSURE: 83 MMHG | HEIGHT: 67 IN | BODY MASS INDEX: 20.09 KG/M2 | HEART RATE: 85 BPM | OXYGEN SATURATION: 100 % | WEIGHT: 128 LBS

## 2019-05-03 DIAGNOSIS — E05.90 PRIMARY HYPERTHYROIDISM: ICD-10-CM

## 2019-05-03 DIAGNOSIS — R25.1 TREMOR: ICD-10-CM

## 2019-05-03 DIAGNOSIS — H53.9 VISION CHANGES: Primary | ICD-10-CM

## 2019-05-03 LAB
ALBUMIN SERPL-MCNC: 4.6 G/DL (ref 3.4–5)
ALP SERPL-CCNC: 92 U/L (ref 40–150)
ALT SERPL W P-5'-P-CCNC: 24 U/L (ref 0–70)
ANION GAP SERPL CALCULATED.3IONS-SCNC: 7 MMOL/L (ref 3–14)
AST SERPL W P-5'-P-CCNC: 18 U/L (ref 0–45)
BILIRUB SERPL-MCNC: 0.7 MG/DL (ref 0.2–1.3)
BUN SERPL-MCNC: 14 MG/DL (ref 7–30)
CALCIUM SERPL-MCNC: 9.4 MG/DL (ref 8.5–10.1)
CHLORIDE SERPL-SCNC: 105 MMOL/L (ref 94–109)
CO2 SERPL-SCNC: 27 MMOL/L (ref 20–32)
CREAT SERPL-MCNC: 0.89 MG/DL (ref 0.66–1.25)
ERYTHROCYTE [DISTWIDTH] IN BLOOD BY AUTOMATED COUNT: 12.8 % (ref 10–15)
GFR SERPL CREATININE-BSD FRML MDRD: >90 ML/MIN/{1.73_M2}
GLUCOSE SERPL-MCNC: 78 MG/DL (ref 70–99)
HCT VFR BLD AUTO: 44.5 % (ref 40–53)
HGB BLD-MCNC: 15.5 G/DL (ref 13.3–17.7)
MCH RBC QN AUTO: 30.8 PG (ref 26.5–33)
MCHC RBC AUTO-ENTMCNC: 34.8 G/DL (ref 31.5–36.5)
MCV RBC AUTO: 89 FL (ref 78–100)
PLATELET # BLD AUTO: 235 10E9/L (ref 150–450)
POTASSIUM SERPL-SCNC: 4.1 MMOL/L (ref 3.4–5.3)
PROT SERPL-MCNC: 8.5 G/DL (ref 6.8–8.8)
RBC # BLD AUTO: 5.03 10E12/L (ref 4.4–5.9)
SODIUM SERPL-SCNC: 139 MMOL/L (ref 133–144)
T3FREE SERPL-MCNC: 3.1 PG/ML (ref 2.3–4.2)
TSH SERPL DL<=0.005 MIU/L-ACNC: 1.06 MU/L (ref 0.4–4)
WBC # BLD AUTO: 4.9 10E9/L (ref 4–11)

## 2019-05-03 PROCEDURE — 99213 OFFICE O/P EST LOW 20 MIN: CPT | Performed by: NURSE PRACTITIONER

## 2019-05-03 PROCEDURE — 84481 FREE ASSAY (FT-3): CPT | Performed by: NURSE PRACTITIONER

## 2019-05-03 PROCEDURE — 80053 COMPREHEN METABOLIC PANEL: CPT | Performed by: NURSE PRACTITIONER

## 2019-05-03 PROCEDURE — 85027 COMPLETE CBC AUTOMATED: CPT | Performed by: NURSE PRACTITIONER

## 2019-05-03 PROCEDURE — 36415 COLL VENOUS BLD VENIPUNCTURE: CPT | Performed by: NURSE PRACTITIONER

## 2019-05-03 PROCEDURE — 84443 ASSAY THYROID STIM HORMONE: CPT | Performed by: NURSE PRACTITIONER

## 2019-05-03 ASSESSMENT — MIFFLIN-ST. JEOR: SCORE: 1514.23

## 2019-05-03 NOTE — PATIENT INSTRUCTIONS
At [facility], we strive to deliver an exceptional experience to you, every time we see you.  If you receive a survey in the mail, please send us back your thoughts. We really do value your feedback.    Your care team:                            Family Medicine Internal Medicine   MD Yaya Cervantes MD Shantel Branch-Fleming, MD Katya Georgiev PA-C Megan Hill, APRN DESTINI Mai MD Pediatrics   Armando Cardona, MATTHIAS Galicia, MD Racquel Patel APRN CNP   MD Kelley Rutherford MD Deborah Mielke, MD Connie Brown, APRN [facility]      Clinic hours: Monday - Thursday 7 am-7 pm; Fridays 7 am-5 pm.   Urgent care: Monday - Friday 11 am-9 pm; Saturday and Sunday 9 am-5 pm.  Pharmacy : Monday -Thursday 8 am-8 pm; Friday 8 am-6 pm; Saturday and Sunday 9 am-5 pm.     Clinic: [phone]   Pharmacy: [phone]        Patient Education     Hyperthyroidism    You have hyperthyroidism. This means you have a thyroid gland that makes too much thyroid hormone. This hormone is vital to body growth and metabolism. If you make too much thyroid hormone, many body processes speed up and may not work right. This can cause symptoms throughout the body.  There are a number of causes of hyperthyroidism. The most common cause is Grave s disease. This occurs when the body s immune system causes the thyroid to grow and make more thyroid hormone than needed. Another form of hypothyroidism, called postpartum thyroiditis, occurs shortly after childbirth.  Symptoms of hyperthyroidism include:    Nervousness, anxiety, irritability    Shaking (tremors) that affects the hands and fingers    Weight loss despite having a normal or increased appetite    Low tolerance to heat    Sweating more than normal    Fast or irregular heartbeat    Lighter or irregular periods (women only)    More frequent bowel movements    Enlarged thyroid gland (goiter)    Bulging eyes    Problems  sleeping    Muscle weakness    Fatigue    Swelling of the hands, ankles, or feet (older adults only)  Your healthcare provider will need to do some tests to see exactly which form of hypothyroidism you have. This is because the treatments are different. Treatment for hyperthyroidism may include taking medicines. For instance, antithyroid medicines may be prescribed. These help lower the amount of thyroid hormone made by the thyroid gland. Beta-blockers may be prescribed as well. Tips for taking medicines are given below.  Radioiodine ablation or surgery may also be advised. Your healthcare provider will tell you more about these options if needed.  Home care  Tips for taking medicines    Take any medicines you re prescribed as directed.    Take your medicine at the same times each day.    To decrease the chance of drug interactions, check with your pharmacist before using over-the-counter medicines with your prescribed medicines.    Use a pillbox labeled with the days of the week. This will help you remember to take your medicine each day.    Tell your provider if you have any side effects from your medicines that bother you.    Never stop taking medicines on your own. If you do, your symptoms will return.  General care    Always talk with your provider before trying other medicines or treatments for your thyroid problem.    If you see other healthcare providers, be sure to let them know about your thyroid problem.  Follow-up care  See your healthcare provider for checkups as advised. You may need regular tests to check the level of thyroid hormone in your blood.  When to seek medical advice  Call your healthcare provider right away if any of these occur:    New symptoms occur    Symptoms return, continue, or worsen even after treatment    Extreme fatigue    Puffy hands, face, or feet    Confusion  Call 911  Call 911 if any of these occur:    Fainting    Chest pain    Shortness of breath or trouble breathing  Date  Last Reviewed: 4/1/2018 2000-2018 [employer]. [address] [place] [address], [place] [address]. All rights reserved. This information is not intended as a substitute for professional medical care. Always follow your healthcare professional's instructions.

## 2019-05-03 NOTE — PROGRESS NOTES
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    Musculoskeletal problem/pain      Duration: few months    Description  Location: both hands but mostly left- dullness in fingertips, slight temor when moving mouse or using phone notices a slight twitch.     Intensity:  mild    Accompanying signs and symptoms: numbness, tremors    History  Previous similar problem: YES  Previous evaluation:  none    Precipitating or alleviating factors:  Trauma or overuse: YES- possibly typing  Aggravating factors include: fine motor movements    Therapies tried and outcome: nothing      Hyperthyroidism-  Has history of hyperthyroidism, diagnosed in 2014 and took Tapazole X 6 months but has not been on anything since.  He's had normal TSH since but now complains of tremor in his hands.  Weight has been stable and he  Denies palpitaitions.    Additional history: as documented    Reviewed  and updated as needed this visit by clinical staff  Tobacco  Allergies  Meds  Med Hx  Surg Hx  Fam Hx  Soc Hx        Reviewed and updated as needed this visit by Provider         Patient Active Problem List   Diagnosis     Acne vulgaris     CARDIOVASCULAR SCREENING; LDL GOAL LESS THAN 160     Primary hyperthyroidism     Vitreous syneresis of right eye     Allergic rhinitis due to dust mite     Chronic seasonal allergic rhinitis due to pollen     Past Surgical History:   Procedure Laterality Date     ENT  "SURGERY Right 10-20-16    I & D Neck abscess. Dr. Chen     EXTRACTION(S) DENTAL      wisdom     PE TUBES  age 6       Social History     Tobacco Use     Smoking status: Never Smoker     Smokeless tobacco: Never Used   Substance Use Topics     Alcohol use: Yes     Family History   Problem Relation Age of Onset     Hypertension Mother      Myocardial Infarction Maternal Uncle 54     C.A.D. No family hx of      Cerebrovascular Disease No family hx of      Glaucoma No family hx of      Macular Degeneration No family hx of      Anesthesia Reaction No family hx of      Thrombophilia No family hx of      Bleeding Disorder No family hx of      Coronary Artery Disease No family hx of      Diabetes No family hx of      Cancer No family hx of          No current outpatient medications on file.     BP Readings from Last 3 Encounters:   05/03/19 129/83   12/14/18 (!) 142/94   10/22/18 131/84    Wt Readings from Last 3 Encounters:   05/03/19 58.1 kg (128 lb)   12/14/18 56.7 kg (125 lb)   10/22/18 56.7 kg (125 lb)            OBJECTIVE:      /83 (BP Location: Left arm)   Pulse 85   Temp 98.2  F (36.8  C) (Oral)   Resp 20   Ht 1.702 m (5' 7\")   Wt 58.1kg (128 lb)   SpO2 100%   BMI 20.05 kg/m    Body mass index is 19.58 kg/m .  GENERAL: healthy, alert and no distress  EYES: Eyes grossly normal to inspection, PERRL and conjunctivae and sclerae normal  HENT: ear canals and TM's normal, nose and mouth without ulcers or lesions  NECK: no adenopathy, no asymmetry, masses, or scars and thyroid normal to palpation  RESP: lungs clear to auscultation - no rales, rhonchi or wheezes  CV: regular rate and rhythm, normal S1 S2, no S3 or S4, no murmur, click or rub, no peripheral edema and peripheral pulses strong  ABDOMEN: soft, nontender, no hepatosplenomegaly, no masses and bowel sounds normal  MS: no gross musculoskeletal defects noted, no edema  PSYCH: mentation appears normal, affect normal/bright  NEURO:  Slightly " hyperrreflexic in LE, upper extremities normal    Component      Latest Ref Rng & Units 5/3/2019   Sodium      133 - 144 mmol/L 139   Potassium      3.4 - 5.3 mmol/L 4.1   Chloride      94 - 109 mmol/L 105   Carbon Dioxide      20 - 32 mmol/L 27   Anion Gap      3 - 14 mmol/L 7   Glucose      70 - 99 mg/dL 78   Urea Nitrogen      7 - 30 mg/dL 14   Creatinine      0.66 - 1.25 mg/dL 0.89   GFR Estimate      >60 mL/min/1.73:m2 >90   GFR Estimate If Black      >60 mL/min/1.73:m2 >90   Calcium      8.5 - 10.1 mg/dL 9.4   Bilirubin Total      0.2 - 1.3 mg/dL 0.7   Albumin      3.4 - 5.0 g/dL 4.6   Protein Total      6.8 - 8.8 g/dL 8.5   Alkaline Phosphatase      40 - 150 U/L 92   ALT      0 - 70 U/L 24   AST      0 - 45 U/L 18   WBC      4.0 - 11.0 10e9/L 4.9   RBC Count      4.4 - 5.9 10e12/L 5.03   Hemoglobin      13.3 - 17.7 g/dL 15.5   Hematocrit      40.0 - 53.0 % 44.5   MCV      78 - 100 fl 89   MCH      26.5 - 33.0 pg 30.8   MCHC      31.5 - 36.5 g/dL 34.8   RDW      10.0 - 15.0 % 12.8   Platelet Count      150 - 450 10e9/L 235   TSH      0.40 - 4.00 mU/L 1.06   Free T3      2.3 - 4.2 pg/mL 3.1     Assessment & Plan     1. Vision changes  Referring to optometry for eye check.  - OPTOMETRY REFERRAL    2. Primary hyperthyroidism  Checking labs, will refer to endocrinology for abnormal results.  - TSH with free T4 reflex  - T3, Free    3. Tremor  Checking labs, am suspicious for thyroid.  - Comprehensive metabolic panel  - CBC with platelets       See Patient Instructions    Return in about 1 month (around 5/31/2019), or if symptoms worsen or fail to improve.    JUDY Yates [facility]

## 2019-09-05 ENCOUNTER — OFFICE VISIT (OUTPATIENT)
Dept: FAMILY MEDICINE | Facility: CLINIC | Age: 29
End: 2019-09-05
Payer: COMMERCIAL

## 2019-09-05 VITALS
RESPIRATION RATE: 16 BRPM | WEIGHT: 128 LBS | TEMPERATURE: 99 F | OXYGEN SATURATION: 100 % | DIASTOLIC BLOOD PRESSURE: 80 MMHG | HEART RATE: 78 BPM | HEIGHT: 67 IN | SYSTOLIC BLOOD PRESSURE: 136 MMHG | BODY MASS INDEX: 20.09 KG/M2

## 2019-09-05 DIAGNOSIS — L72.3 INFECTED SEBACEOUS CYST: Primary | ICD-10-CM

## 2019-09-05 DIAGNOSIS — L08.9 INFECTED SEBACEOUS CYST: Primary | ICD-10-CM

## 2019-09-05 PROCEDURE — 99213 OFFICE O/P EST LOW 20 MIN: CPT | Performed by: FAMILY MEDICINE

## 2019-09-05 PROCEDURE — 87077 CULTURE AEROBIC IDENTIFY: CPT | Performed by: FAMILY MEDICINE

## 2019-09-05 PROCEDURE — 87186 SC STD MICRODIL/AGAR DIL: CPT | Performed by: FAMILY MEDICINE

## 2019-09-05 PROCEDURE — 87070 CULTURE OTHR SPECIMN AEROBIC: CPT | Performed by: FAMILY MEDICINE

## 2019-09-05 RX ORDER — SULFAMETHOXAZOLE/TRIMETHOPRIM 800-160 MG
1 TABLET ORAL 2 TIMES DAILY
Qty: 20 TABLET | Refills: 0 | Status: SHIPPED | OUTPATIENT
Start: 2019-09-05 | End: 2019-11-07

## 2019-09-05 ASSESSMENT — PAIN SCALES - GENERAL: PAINLEVEL: MILD PAIN (3)

## 2019-09-05 ASSESSMENT — MIFFLIN-ST. JEOR: SCORE: 1509.23

## 2019-09-05 NOTE — LETTER
September 9, 2019        Arcadio Adams  [address] [address]        Mr. Adams,    Your skin culture grew out small amounts bacteria (which may or may not be related to your infection) that likely are sensitive to the antibiotic you have been given.  Complete the medication as prescribed and if you experience new, worsening or persistent symptoms, you should call or return for a recheck.    Please contact the clinic if you have additional questions.  Thank you.    Sincerely,      Lavell Hand MD/jony      Resulted Orders   Skin Culture Aerobic Bacterial   Result Value Ref Range    Specimen Description Right Axilla     Special Requests Specimen collected in eSwab transport (white cap)     Culture Micro Light growth  Staphylococcus lugdunensis   (A)     Culture Micro (A)      Light growth  Coagulase negative Staphylococcus  Susceptibility testing not routinely done

## 2019-09-05 NOTE — PROGRESS NOTES
"Subjective     Arcadio Adams is a 29 year old male who presents to clinic today for the following health issues:    HPI   Concern - cyst under right armpit  Onset: ongoing for years, intermittent issue but worse over the last month    Description:   Cyst under right armpit, patient states had it for years and on and off, inflamed.     Intensity: moderate pain associated    Progression of Symptoms:  worsening    Accompanying Signs & Symptoms:  Redness, fluid filled, drainage    Previous history of similar problem:   Yes, had it lanced and drained before.    Precipitating factors:   Worsened by: None.    Alleviating factors:  Improved by: none.    Therapies Tried and outcome: keeping the area clean, occasionally presses on the area when weeping but has not poked it himself     Past medical, family, and social histories, medications, and allergies are reviewed and updated in Epic.          Review of Systems   ROS COMP: Constitutional, HEENT, cardiovascular, pulmonary, gi and gu systems are negative, except as otherwise noted.    This document serves as a record of the services and decisions personally performed and made by Stanislaw Arevalo MD. It was created on his behalf by Lux Armando, a trained medical scribe. The creation of this document is based on the provider's statements to the medical scribe.  Lux Armando 11:37 AM September 5, 2019      Objective    /80   Pulse 78   Temp 99  F (37.2  C) (Oral)   Resp 16   Ht 1.702 m (5' 7\")   Wt 58.1 kg (128 lb)   SpO2 100%   BMI 20.05 kg/m    Body mass index is 20.05 kg/m .  Physical Exam   GENERAL: healthy, alert and no distress  EYES: Eyes grossly normal to inspection, PERRL, EOMI, sclerae white and conjunctivae normal  MS: no gross musculoskeletal defects noted, no edema  SKIN: he has a sebaceous cyst under the right axilla which is draining a slight amount of pus when pressure is applied. It is erythematous and tender and there is nearby scar " tissue palpable  NEURO: Normal strength and tone, sensory exam grossly normal, mentation intact, oriented times 3 and cranial nerves 2-12 intact  PSYCH: mentation appears normal, affect normal/bright     Assessment & Plan     (L72.3,  L08.9) Infected sebaceous cyst  (primary encounter diagnosis)  Comment: acute on chronic sebaceous cyst under the right axilla, had prior incision and drainage. Even if the antibiotic clears the current infection I think he should follow-up with general surgery to discuss possible definitve treatment.   Plan: Skin Culture Aerobic Bacterial, GENERAL SURG         ADULT REFERRAL, sulfamethoxazole-trimethoprim         (BACTRIM DS/SEPTRA DS) 800-160 MG tablet            Return in about 10 days (around 9/15/2019) for recheck if symptoms fail to resolve by then, or sooner if symptoms worsen.    The information in this document, created by the medical scribe for me, accurately reflects the services I personally performed and the decisions made by me. I have reviewed and approved this document for accuracy prior to leaving the patient care area.  September 5, 2019 11:43 AM    Lavell Hand MD

## 2019-09-05 NOTE — PATIENT INSTRUCTIONS
Begin treatment with antibiotic today and follow-up with     At [facility], we strive to deliver an exceptional experience to you, every time we see you.  If you receive a survey in the mail, please send us back your thoughts. We really do value your feedback.    Based on your medical history, these are the current health maintenance/preventive care services that you are due for (some may have been done at this visit.)  Health Maintenance Due   Topic Date Due     HIV SCREENING  05/20/2006     PREVENTIVE CARE VISIT  08/07/2016     EYE EXAM  08/20/2016     PHQ-2  01/01/2019     INFLUENZA VACCINE (1) 09/01/2019         Suggested websites for health information:  Www.Accredible.org : Up to date and easily searchable information on multiple topics.  Www.medlineplus.gov : medication info, interactive tutorials, watch real surgeries online  Www.familydoctor.org : good info from the Academy of Family Physicians  Www.cdc.gov : public health info, travel advisories, epidemics (H1N1)  Www.aap.org : children's health info, normal development, vaccinations  Www.health.[facility].[place].us : [place] dept of health, public health issues in [place], N1N1    Your care team:                            Family Medicine Internal Medicine   MD Yaya Cervantes MD Shantel Branch-Fleming, MD Katya Georgiev PA-C Nam Ho, MD Pediatrics   MATTHIAS Mckeon, DESTINI Cheney APRMD Kelley Medina CNP, MD Deborah Mielke, MD Kim Thein, APRN [facility]      Clinic hours: Monday - Thursday 7 am-7 pm; Fridays 7 am-5 pm.   Urgent care: Monday - Friday 11 am-9 pm; Saturday and Sunday 9 am-5 pm.  Pharmacy : Monday -Thursday 8 am-8 pm; Friday 8 am-6 pm; Saturday and Sunday 9 am-5 pm.     Clinic: [phone]   Pharmacy: [phone]

## 2019-09-09 LAB
BACTERIA SPEC CULT: ABNORMAL
BACTERIA SPEC CULT: ABNORMAL
Lab: ABNORMAL
SPECIMEN SOURCE: ABNORMAL

## 2019-11-07 ENCOUNTER — OFFICE VISIT (OUTPATIENT)
Dept: FAMILY MEDICINE | Facility: CLINIC | Age: 29
End: 2019-11-07
Payer: COMMERCIAL

## 2019-11-07 VITALS
SYSTOLIC BLOOD PRESSURE: 138 MMHG | HEART RATE: 72 BPM | WEIGHT: 127 LBS | OXYGEN SATURATION: 100 % | HEIGHT: 67 IN | BODY MASS INDEX: 19.93 KG/M2 | TEMPERATURE: 97.9 F | DIASTOLIC BLOOD PRESSURE: 90 MMHG

## 2019-11-07 DIAGNOSIS — L08.9 INFECTED SEBACEOUS CYST: Primary | ICD-10-CM

## 2019-11-07 DIAGNOSIS — L72.3 SEBACEOUS CYST: ICD-10-CM

## 2019-11-07 DIAGNOSIS — L72.3 INFECTED SEBACEOUS CYST: Primary | ICD-10-CM

## 2019-11-07 PROCEDURE — 99214 OFFICE O/P EST MOD 30 MIN: CPT | Performed by: INTERNAL MEDICINE

## 2019-11-07 RX ORDER — SULFAMETHOXAZOLE/TRIMETHOPRIM 800-160 MG
1 TABLET ORAL 2 TIMES DAILY
Qty: 20 TABLET | Refills: 0 | Status: SHIPPED | OUTPATIENT
Start: 2019-11-07 | End: 2019-11-17

## 2019-11-07 ASSESSMENT — MIFFLIN-ST. JEOR: SCORE: 1504.7

## 2019-11-07 NOTE — PROGRESS NOTES
"Subjective     Arcadio Adams is a 29 year old male who presents to clinic today for the following health issues:    HPI     Chief Complaint   Patient presents with     Follow Up     Infected sebaceous cyst under right armpit       Past Medical History:   Diagnosis Date     Abscess 7/15/2013     Acne      Prolonged emergence from general anesthesia     and nausea       Review of Systems   Constitutional: Negative for chills, fatigue and fever.   Gastrointestinal: Negative for diarrhea, nausea and vomiting.   Neurological: Negative for weakness and numbness.       BP (!) 138/90 (BP Location: Right arm, Patient Position: Sitting, Cuff Size: Adult Regular)   Pulse 72   Temp 97.9  F (36.6  C) (Oral)   Ht 1.702 m (5' 7\")   Wt 57.6 kg (127 lb)   SpO2 100%   BMI 19.89 kg/m        Physical Exam  Constitutional:       General: He is not in acute distress.     Appearance: He is not ill-appearing.   Pulmonary:      Effort: Pulmonary effort is normal. No respiratory distress.   Lymphadenopathy:      Cervical: No cervical adenopathy.   Skin:     Findings: No rash.      Comments: Erythematous cyst at right axillary area draining purulent discharge   Neurological:      Mental Status: He is alert and oriented to person, place, and time.           ICD-10-CM    1. Infected sebaceous cyst L72.3 sulfamethoxazole-trimethoprim (BACTRIM DS/SEPTRA DS) 800-160 MG tablet    L08.9 GENERAL SURG ADULT REFERRAL   2. Sebaceous cyst L72.3 GENERAL SURG ADULT REFERRAL       Patient Instructions   Take the prescribed antibiotic and follow-up with the general surgeon for excision of the sebaceous cyst/s.    Call doctor if you develop any side effects from the antibiotic.        "

## 2019-11-07 NOTE — PATIENT INSTRUCTIONS
Take the prescribed antibiotic and follow-up with the general surgeon for excision of the sebaceous cyst/s.    Call doctor if you develop any side effects from the antibiotic.

## 2019-11-13 ASSESSMENT — ENCOUNTER SYMPTOMS
NUMBNESS: 0
NAUSEA: 0
FATIGUE: 0
DIARRHEA: 0
FEVER: 0
CHILLS: 0
VOMITING: 0
WEAKNESS: 0

## 2019-12-13 ENCOUNTER — OFFICE VISIT (OUTPATIENT)
Dept: SURGERY | Facility: CLINIC | Age: 29
End: 2019-12-13
Payer: COMMERCIAL

## 2019-12-13 VITALS
HEART RATE: 78 BPM | DIASTOLIC BLOOD PRESSURE: 89 MMHG | BODY MASS INDEX: 19.93 KG/M2 | HEIGHT: 67 IN | WEIGHT: 127 LBS | SYSTOLIC BLOOD PRESSURE: 136 MMHG

## 2019-12-13 DIAGNOSIS — L08.9 INFECTED SEBACEOUS CYST: Primary | ICD-10-CM

## 2019-12-13 DIAGNOSIS — L72.3 INFECTED SEBACEOUS CYST: Primary | ICD-10-CM

## 2019-12-13 PROCEDURE — 99203 OFFICE O/P NEW LOW 30 MIN: CPT | Performed by: SURGERY

## 2019-12-13 RX ORDER — DOXYCYCLINE 100 MG/1
100 TABLET ORAL 2 TIMES DAILY
Qty: 28 TABLET | Refills: 1 | Status: SHIPPED | OUTPATIENT
Start: 2019-12-13 | End: 2021-11-05

## 2019-12-13 ASSESSMENT — MIFFLIN-ST. JEOR: SCORE: 1504.7

## 2019-12-13 NOTE — PROGRESS NOTES
"Dear Lavell Avery  I was asked to see this patient by Lavell Hand for please see below.  I have seen Arcadio Adams and as you know his chief complaint is sebaceous cyst under right axilla and is inflamed and infected.  Has been on antibiotics bactrim and went down and then came back.  Has drained a few times.       HPI:  Patient is a 29 year old male  with complaints see above  The patient noticed the symptoms about 4 months ago.    Patient has not family history of this problems  Patient has had one on his neck that was infected in the past  antibiotics makes the episode better.      Review Of Systems  Respiratory: No shortness of breath, dyspnea on exertion, cough, or hemoptysis  Cardiovascular: negative  Gastrointestinal: negative  Endocrine: negative  :  negative    10 Point review of systems all others are negative.   /89   Pulse 78   Ht 1.702 m (5' 7\")   Wt 57.6 kg (127 lb)   BMI 19.89 kg/m      Past Medical History:   Diagnosis Date     Abscess 7/15/2013     Acne      Prolonged emergence from general anesthesia     and nausea       Past Surgical History:   Procedure Laterality Date     ENT SURGERY Right 10-20-16    I & D Neck abscess. Dr. Chen     EXTRACTION(S) DENTAL      wisdom     PE TUBES  age 6       Social History     Socioeconomic History     Marital status: Single     Spouse name: Not on file     Number of children: Not on file     Years of education: Not on file     Highest education level: Not on file   Occupational History     Not on file   Social Needs     Financial resource strain: Not on file     Food insecurity:     Worry: Not on file     Inability: Not on file     Transportation needs:     Medical: Not on file     Non-medical: Not on file   Tobacco Use     Smoking status: Never Smoker     Smokeless tobacco: Never Used   Substance and Sexual Activity     Alcohol use: Yes     Comment: occ     Drug use: No     Sexual activity: Not Currently     Partners: " "Female   Lifestyle     Physical activity:     Days per week: Not on file     Minutes per session: Not on file     Stress: Not on file   Relationships     Social connections:     Talks on phone: Not on file     Gets together: Not on file     Attends L[employer]-day service: Not on file     Active member of club or organization: Not on file     Attends meetings of clubs or organizations: Not on file     Relationship status: Not on file     Intimate partner violence:     Fear of current or ex partner: Not on file     Emotionally abused: Not on file     Physically abused: Not on file     Forced sexual activity: Not on file   Other Topics Concern     Parent/sibling w/ CABG, MI or angioplasty before 65F 55M? Not Asked   Social History Narrative     Not on file       No current outpatient medications on file.       Above was reviewed  Physical exam: /89   Pulse 78   Ht 1.702 m (5' 7\")   Wt 57.6 kg (127 lb)   BMI 19.89 kg/m     Patient able to get up on table without difficulty.   Patient is alert and orientated.   Head eyes, nose and mouth within normal limits.  No supraclavicular or cervical adenopathy palpated.  Thyroid within normal limits.  No carotid bruits auscultated.  Lungs are clear to auscultation  Heart is regular rate and rhythm with no murmur or thrills noted. Is tachy.   No costal vertebral angle tenderness noted.  Abdomen is abdomen is soft without significant tenderness, masses, organomegaly or guarding  bowel sounds are positive and no caput medusa noted.     Skin was warm and pink  Normal Affect      Lower extremity edema is not present.    In right axilla has a 2-3 cm sebaceous cyst no erythema at this time.  See scab from where it was draining.    Still feels tender and tissue is mildly inflamed.     Assessment: In right axilla has a 2-3 cm sebaceous cyst no erythema at this time.  See scab from where it was draining.    Still feels tender and tissue is mildly inflamed.    Plan to do  patient HAS " Been improving on antibiotics and area is draining.  Discussed that  We can remove the cyst now but will have to leave it open. And may have to pack it.  Since antibiotics are working will have her Israeli it out and then give another 14 days worth to take 7 days BEFORE SURGERY AND THEN 7 DAYS AFTER SURGERY TO REMOVE IT.   If not getting better may have to just remove and leave open.  If comes back before than will have patient restart the antibiotics.   Plan to do set up a time in 2-3 weeks to excise the cyst.  ..    Risks of surgery include damage to nerves, bleeding, infection, damage to  Vessels, recurrence.     Time spent with the patient with greater that 50% of the time in discussion was 30 minutes.  In discussing the plan.      Amarjit Anthony MD

## 2019-12-13 NOTE — LETTER
"    12/13/2019         RE: Arcadio Adams  [address] [facility] [address]        Dear Colleague,    Thank you for referring your patient, Arcadio Adams, to the [facility]. Please see a copy of my visit note below.    Dear Lavell Avery  I was asked to see this patient by Lavell Hand for please see below.  I have seen Arcadio Adams and as you know his chief complaint is sebaceous cyst under right axilla and is inflamed and infected.  Has been on antibiotics bactrim and went down and then came back.  Has drained a few times.       HPI:  Patient is a 29 year old male  with complaints see above  The patient noticed the symptoms about 4 months ago.    Patient has not family history of this problems  Patient has had one on his neck that was infected in the past  antibiotics makes the episode better.      Review Of Systems  Respiratory: No shortness of breath, dyspnea on exertion, cough, or hemoptysis  Cardiovascular: negative  Gastrointestinal: negative  Endocrine: negative  :  negative    10 Point review of systems all others are negative.   /89   Pulse 78   Ht 1.702 m (5' 7\")   Wt 57.6 kg (127 lb)   BMI 19.89 kg/m       Past Medical History:   Diagnosis Date     Abscess 7/15/2013     Acne      Prolonged emergence from general anesthesia     and nausea       Past Surgical History:   Procedure Laterality Date     ENT SURGERY Right 10-20-16    I & D Neck abscess. Dr. Chen     EXTRACTION(S) DENTAL      wisdom     PE TUBES  age 6       Social History     Socioeconomic History     Marital status: Single     Spouse name: Not on file     Number of children: Not on file     Years of education: Not on file     Highest education level: Not on file   Occupational History     Not on file   Social Needs     Financial resource strain: Not on file     Food insecurity:     Worry: Not on file     Inability: Not on file     Transportation needs:     Medical: Not on " "file     Non-medical: Not on file   Tobacco Use     Smoking status: Never Smoker     Smokeless tobacco: Never Used   Substance and Sexual Activity     Alcohol use: Yes     Comment: occ     Drug use: No     Sexual activity: Not Currently     Partners: Female   Lifestyle     Physical activity:     Days per week: Not on file     Minutes per session: Not on file     Stress: Not on file   Relationships     Social connections:     Talks on phone: Not on file     Gets together: Not on file     Attends [employer] service: Not on file     Active member of club or organization: Not on file     Attends meetings of clubs or organizations: Not on file     Relationship status: Not on file     Intimate partner violence:     Fear of current or ex partner: Not on file     Emotionally abused: Not on file     Physically abused: Not on file     Forced sexual activity: Not on file   Other Topics Concern     Parent/sibling w/ CABG, MI or angioplasty before 65F 55M? Not Asked   Social History Narrative     Not on file       No current outpatient medications on file.       Above was reviewed  Physical exam: /89   Pulse 78   Ht 1.702 m (5' 7\")   Wt 57.6 kg (127 lb)   BMI 19.89 kg/m      Patient able to get up on table without difficulty.   Patient is alert and orientated.   Head eyes, nose and mouth within normal limits.  No supraclavicular or cervical adenopathy palpated.  Thyroid within normal limits.  No carotid bruits auscultated.  Lungs are clear to auscultation  Heart is regular rate and rhythm with no murmur or thrills noted. Is tachy.   No costal vertebral angle tenderness noted.  Abdomen is abdomen is soft without significant tenderness, masses, organomegaly or guarding  bowel sounds are positive and no caput medusa noted.     Skin was warm and pink  Normal Affect      Lower extremity edema is not present.    In right axilla has a 2-3 cm sebaceous cyst no erythema at this time.  See scab from where it was draining.  "   Still feels tender and tissue is mildly inflamed.     Assessment: In right axilla has a 2-3 cm sebaceous cyst no erythema at this time.  See scab from where it was draining.    Still feels tender and tissue is mildly inflamed.    Plan to do  patient HAS Been improving on antibiotics and area is draining.  Discussed that  We can remove the cyst now but will have to leave it open. And may have to pack it.  Since antibiotics are working will have her Swedish it out and then give another 14 days worth to take 7 days BEFORE SURGERY AND THEN 7 DAYS AFTER SURGERY TO REMOVE IT.   If not getting better may have to just remove and leave open.  If comes back before than will have patient restart the antibiotics.   Plan to do set up a time in 2-3 weeks to excise the cyst.  ..    Risks of surgery include damage to nerves, bleeding, infection, damage to  Vessels, recurrence.     Time spent with the patient with greater that 50% of the time in discussion was 30 minutes.  In discussing the plan.      Amarjit Anthony MD      Again, thank you for allowing me to participate in the care of your patient.        Sincerely,        Amarjit Anthony MD

## 2019-12-13 NOTE — PATIENT INSTRUCTIONS
Assessment: In right axilla has a 2-3 cm sebaceous cyst no erythema at this time.  See scab from where it was draining.    Still feels tender and tissue is mildly inflamed.    Plan to do  patient HAS Been improving on antibiotics and area is draining.  Discussed that  We can remove the cyst now but will have to leave it open. And may have to pack it.  Since antibiotics are working will have her Burkin[place] it out and then give another 14 days worth to take 7 days BEFORE SURGERY AND THEN 7 DAYS AFTER SURGERY TO REMOVE IT.   If not getting better may have to just remove and leave open.  If comes back before than will have patient restart the antibiotics.   Plan to do set up a time in 2-3 weeks to excise the cyst.  ..    Risks of surgery include damage to nerves, bleeding, infection, damage to  Vessels, recurrence.       SKIN AND SUBCUTANEOUS SURGERY DISCHARGE INSTRUCTIONS  DR. MARCEL MATT    1. You may resume your regular diet when you feel you are ready to. DO NOT drink alcoholic beverages for 24 hours or while you are taking prescription medication.    2. Limit your activities for the first 48 hours. Gradually, increase them as tolerated. You may use stairs. I encourage you to walk as tolerated.    3. You will have some discomfort at the incision sites. This is expected. This should improve over the next 2-3 days. Ice and pain medication will help with this pain. Use prescribed pain medication as instructed.    4. Bruising and mild swelling is normal after surgery. The area below and around the incision(s) will be hard and elevated. This is normal. I call it the healing ridge. This will resolve slowly over the next several months. If you feel the pain is increasing and cannot explain it by increasing activity please call us at [phone].    5. The dressing will often have some blood on it. You may shower 24 hours after surgery. Clean gently over incision site. If clear plastic covering or steri-strip comes off  and there is still some bleeding or drainage then cover with gauze or band-aid. If no bleeding, there is no reason to cover site. If you were given an abdominal binder at time of surgery it may be removed after 24 hours after surgery. You may continue to wear it however for comfort. I suggest  you wear an old t-shirt under the abdominal binder for a more comfortable wear.    6. Avoid Aspirin for the first 72 hours after the procedure. This medication may increase the tendency to bleed.    7. Use the following medications (in addition to your normal meds) as shown:  a. Percocet 5 mg 1-2 every 6 hours as needed for severe pain. This contains 325 mg of Tylenol (acetaminophen) per tablet.  Please do not take more than 4 grams of Tylenol (acetaminophen) per day. For example, you may take 1 Percocet and 1 Tylenol, or 2 Percocet and no Tylenol, or 2 Tylenol and no Percocet every 6 hours.  b. Tylenol (acetaminophen) 500 mg every 6 hours as needed for mild pain. Do not take more than 1000 mg every 6 hours. (see above).  c. Motrin (ibuprofen) 200-800 mg every 6 hours as needed for mild to moderate pain. Take with food.     8. Notify Dr. Anthony's clinic at [phone] if:    Your discomfort is not relieved by your pain medication.    You have signs of infection such as temperature above 100.5 degrees orally, chills, or increasing daily discomfort.    Incision site is becoming more red and/or there is purulent drainage.    You have questions or concerns.    9. Please call [phone] to schedule a follow up appointment in about 2 weeks.  If you have not already been given one.     10. When taking narcotics (pain medication more than Tylenol [acetaminophen] and Motrin [ibuprofen]) it is important to keep your stools soft to avoid constipation and pain with straining. This is best done by drinking fluids (non-alcoholic and non-caffeinated) and taking a stool softener (i.e. Metamucil or milk of magnesia). You may be able  to use non-narcotics for pain relief especially by the 3rd post- operative day. Tylenol (acetaminophen) 500 mg every 6 hours and/or Motrin (ibuprofen) 200-800 mg every 6 hours. Please do not take more than 4 grams of Tylenol (acetaminophen) per day. Remember your Percocet does have Tylenol (acetaminophen) already in it. Please take Motrin (ibuprofen) with food to help protect the stomach. If you have a history of stomach ulcers or stomach problems, do not take Motrin (ibuprofen).     11. Do not drive or operate heavy machinery for 24 hours after surgery or when taking narcotics. You may resume driving when feel that you can safely avoid an accident and are not taking narcotics. This is usually 5 to 7 days after surgery. You should not be alone for 24 hours after surgery.    12. Have milk of magnesia available at home so that when you take the pain medications you take 1-2 teaspoons a day,  to help reduce problems with constipation.

## 2020-01-03 ENCOUNTER — OFFICE VISIT (OUTPATIENT)
Dept: SURGERY | Facility: CLINIC | Age: 30
End: 2020-01-03
Payer: COMMERCIAL

## 2020-01-03 VITALS
BODY MASS INDEX: 19.93 KG/M2 | WEIGHT: 127 LBS | SYSTOLIC BLOOD PRESSURE: 136 MMHG | HEIGHT: 67 IN | DIASTOLIC BLOOD PRESSURE: 83 MMHG | HEART RATE: 78 BPM

## 2020-01-03 DIAGNOSIS — L72.3 INFECTED SEBACEOUS CYST: Primary | ICD-10-CM

## 2020-01-03 DIAGNOSIS — L08.9 INFECTED SEBACEOUS CYST: Primary | ICD-10-CM

## 2020-01-03 PROCEDURE — 88304 TISSUE EXAM BY PATHOLOGIST: CPT | Performed by: PATHOLOGY

## 2020-01-03 PROCEDURE — 11402 EXC TR-EXT B9+MARG 1.1-2 CM: CPT | Performed by: SURGERY

## 2020-01-03 RX ORDER — SULFAMETHOXAZOLE/TRIMETHOPRIM 800-160 MG
1 TABLET ORAL 2 TIMES DAILY
Qty: 20 TABLET | Refills: 1 | Status: SHIPPED | OUTPATIENT
Start: 2020-01-03 | End: 2021-11-05

## 2020-01-03 ASSESSMENT — MIFFLIN-ST. JEOR: SCORE: 1504.7

## 2020-01-03 NOTE — LETTER
"    1/3/2020         RE: Arcadio Adams  [address]  [facility] [address]        Dear Colleague,    Thank you for referring your patient, Arcadio Adams, to the [facility]. Please see a copy of my visit note below.    Risks explained including bleeding, infection, scar and recurrence.    Patient has In right axilla has a 2-3 cm sebaceous cyst no erythema at this time. But is draining and is  and patient was doing better on the doxycycline but came back after stopping it.  And did not restart it.   So we discussed retrying the antibiotics and then have it for 7 days before and 7 days after, or going to the OR and removing it and leaving it open or just doing and incision and drainage and leave open and then when it is small and closed can excise.     Patient want to just get it open and drained.     After sterile prep with betadine the area was infiltrated with local.  An incision was made starting at the inferior opening where there was some pus draining.  There was no sebaceous cyst looking oil or an obvious lining.  It is hard to tell for sure.  I did remove some skin in the middle of the incision to look for a cyst and to allow it to heal from the inside out.   /83   Pulse 78   Ht 1.702 m (5' 7\")   Wt 57.6 kg (127 lb)   BMI 19.89 kg/m     Procedure  Preop dx:  Infected sebaceous cyst not responding well to antibiotics.   Post op dx:  Not sure this was a sebaceous cyst as the drainage did not look like that  Procedure:  After sterile prep with betadine the area was infiltrated with local.  An incision was made starting at the inferior opening where there was some pus draining.  There was no sebaceous cyst looking oil or an obvious lining.  It is hard to tell for sure.  I did remove some skin in the middle of the incision to look for a cyst and to allow it to heal from the inside out.   Anesthesia:  2% lidocaine  with epinephrine   Est. blood loss: 5 " cc  Patient tolerated procedure well.  Hemostasis obtained with pressure.  Follow up with me in 4-8  weeks.   We will see how this heals and if it does not show sebaceous cyst than perhaps we are done.        Again, thank you for allowing me to participate in the care of your patient.        Sincerely,        Amarjit Anthony MD

## 2020-01-03 NOTE — PATIENT INSTRUCTIONS
Patient tolerated procedure well.  Hemostasis obtained with pressure.  Follow up with me in 4-8  weeks.   We will see how this heals and if it does not show sebaceous cyst than perhaps we are done.        SKIN AND SUBCUTANEOUS SURGERY DISCHARGE INSTRUCTIONS  DR. MARCEL MATT    1. You may resume your regular diet when you feel you are ready to. DO NOT drink alcoholic beverages for 24 hours or while you are taking prescription medication.    2. Limit your activities for the first 48 hours. Gradually, increase them as tolerated. You may use stairs. I encourage you to walk as tolerated.     3. You will have some discomfort at the incision sites. This is expected. This should improve over the next 2-3 days. Ice and pain medication will help with this pain. Use prescribed pain medication as instructed.    4. Bruising and mild swelling is normal after surgery. The area below and around the incision(s) will be hard and elevated. This is normal. I call it the healing ridge. This will resolve slowly over the next several months. If you feel the pain is increasing and cannot explain it by increasing activity please call us at [phone].    5. The dressing will often have some blood on it. You may shower 24 hours after surgery. Clean gently over incision site. If clear plastic covering or steri-strip comes off and there is still some bleeding or drainage then cover with gauze or band-aid. If no bleeding, there is no reason to cover site. If you were given an abdominal binder at time of surgery it may be removed after 24 hours after surgery. You may continue to wear it however for comfort. I suggest  you wear an old t-shirt and place a panty liner on the t shirt over this area.     6. Avoid Aspirin for the first 72 hours after the procedure. This medication may increase the tendency to bleed.    7. Use the following medications (in addition to your normal meds) as shown:  a. Percocet 5 mg 1-2 every 6 hours as needed for  severe pain. This contains 325 mg of Tylenol (acetaminophen) per tablet.  Please do not take more than 4 grams of Tylenol (acetaminophen) per day. For example, you may take 1 Percocet and 1 Tylenol, or 2 Percocet and no Tylenol, or 2 Tylenol and no Percocet every 6 hours.  b. Tylenol (acetaminophen) 500 mg every 6 hours as needed for mild pain. Do not take more than 1000 mg every 6 hours. (see above).  c. Motrin (ibuprofen) 200-800 mg every 6 hours as needed for mild to moderate pain. Take with food.     8. Notify Dr. Anthony's clinic at [phone] if:    Your discomfort is not relieved by your pain medication.    You have signs of infection such as temperature above 100.5 degrees orally, chills, or increasing daily discomfort.    Incision site is becoming more red and/or there is purulent drainage.    You have questions or concerns.    9. Please call [phone] to schedule a follow up appointment in about 2 weeks.  If you have not already been given one.     10. When taking narcotics (pain medication more than Tylenol [acetaminophen] and Motrin [ibuprofen]) it is important to keep your stools soft to avoid constipation and pain with straining. This is best done by drinking fluids (non-alcoholic and non-caffeinated) and taking a stool softener (i.e. Metamucil or milk of magnesia). You may be able to use non-narcotics for pain relief especially by the 3rd post- operative day. Tylenol (acetaminophen) 500 mg every 6 hours and/or Motrin (ibuprofen) 200-800 mg every 6 hours. Please do not take more than 4 grams of Tylenol (acetaminophen) per day. Remember your Percocet does have Tylenol (acetaminophen) already in it. Please take Motrin (ibuprofen) with food to help protect the stomach. If you have a history of stomach ulcers or stomach problems, do not take Motrin (ibuprofen).     11. Do not drive or operate heavy machinery for 24 hours after surgery or when taking narcotics. You may resume driving when feel  that you can safely avoid an accident and are not taking narcotics. This is usually 5 to 7 days after surgery. You should not be alone for 24 hours after surgery.    12. Have milk of magnesia available at home so that when you take the pain medications you take 1-2 teaspoons a day,  to help reduce problems with constipation.

## 2020-01-03 NOTE — PROGRESS NOTES
"Risks explained including bleeding, infection, scar and recurrence.    Patient has In right axilla has a 2-3 cm sebaceous cyst no erythema at this time. But is draining and is  and patient was doing better on the doxycycline but came back after stopping it.  And did not restart it.   So we discussed retrying the antibiotics and then have it for 7 days before and 7 days after, or going to the OR and removing it and leaving it open or just doing and incision and drainage and leave open and then when it is small and closed can excise.     Patient want to just get it open and drained.     After sterile prep with betadine the area was infiltrated with local.  An incision was made starting at the inferior opening where there was some pus draining.  There was no sebaceous cyst looking oil or an obvious lining.  It is hard to tell for sure.  I did remove some skin in the middle of the incision to look for a cyst and to allow it to heal from the inside out.   /83   Pulse 78   Ht 1.702 m (5' 7\")   Wt 57.6 kg (127 lb)   BMI 19.89 kg/m    Procedure  Preop dx:  Infected sebaceous cyst not responding well to antibiotics.   Post op dx:  Not sure this was a sebaceous cyst as the drainage did not look like that  Procedure:  After sterile prep with betadine the area was infiltrated with local.  An incision was made starting at the inferior opening where there was some pus draining.  There was no sebaceous cyst looking oil or an obvious lining.  It is hard to tell for sure.  I did remove some skin in the middle of the incision to look for a cyst and to allow it to heal from the inside out.   Anesthesia:  2% lidocaine  with epinephrine   Est. blood loss: 5 cc  Patient tolerated procedure well.  Hemostasis obtained with pressure.  Follow up with me in 4-8  weeks.   We will see how this heals and if it does not show sebaceous cyst than perhaps we are done.      "

## 2020-01-03 NOTE — LETTER
[facility]           [address]           [place] [address]           Tel [phone]  Arcadio Adams  [address]  [place] [address]      January 10, 2020    Dear Arcadio,  This letter is to inform you of the results of your pathology report on your lump I removed from your right axilla.  If you have questions please feel free to call my assistant  At [phone] .    Your pathology report was:  FINAL DIAGNOSIS:   Skin lesion, right axilla, excision:   - Intradermal acutely and chronically inflamed granulation tissue with   foreign body reaction, extending to   peripheral margin.   - No cyst epithelial lining identified.   So no cancer.  We originally thought it was an infected sebaceous cyst.  But the path does not suggest that.  It may have been an ingrown hair that caused this..  Please follow up to make sure you are doing well.    If you do have further questions please don t hesitate to call my assistant at  .  We do not have someone answering the phone all the time at my assistants number so if leave a message may take a day or so to get back to you.  So if more urgent then call the below number.    To make an appointment call [phone]: .   Sincerely,     Amarjit Anthony M.D.  ___

## 2020-01-09 LAB — COPATH REPORT: NORMAL

## 2020-02-04 ENCOUNTER — TELEPHONE (OUTPATIENT)
Dept: FAMILY MEDICINE | Facility: CLINIC | Age: 30
End: 2020-02-04

## 2020-02-04 DIAGNOSIS — L08.9 INFECTED SEBACEOUS CYST: Primary | ICD-10-CM

## 2020-02-04 DIAGNOSIS — L72.3 INFECTED SEBACEOUS CYST: Primary | ICD-10-CM

## 2020-02-05 NOTE — TELEPHONE ENCOUNTER
Reason for call:  Other   Patient called regarding (reason for call): call back  Additional comments: patient says he cyst has not disappeared would like to know other remedies for this     Phone number to reach patient:  Cell number on file:    Telephone Information:   Mobile [phone]       Best Time:  12-5pm  Can we leave a detailed message on this number?  YES'

## 2020-02-07 RX ORDER — SULFAMETHOXAZOLE/TRIMETHOPRIM 800-160 MG
1 TABLET ORAL 2 TIMES DAILY
Qty: 28 TABLET | Refills: 1 | Status: SHIPPED | OUTPATIENT
Start: 2020-02-07 | End: 2021-11-05

## 2020-02-07 NOTE — TELEPHONE ENCOUNTER
Talked to patient yesterday.    He has another cyst on his neck that is inflamed.    I am putting him on bactrim and will see him next week to see if it  Is improving.   And we can discuss the other cyst that I opened in the clinic to allow to drain.   Amarjit Anthony MD

## 2020-02-14 ENCOUNTER — OFFICE VISIT (OUTPATIENT)
Dept: SURGERY | Facility: CLINIC | Age: 30
End: 2020-02-14
Payer: COMMERCIAL

## 2020-02-14 ENCOUNTER — DOCUMENTATION ONLY (OUTPATIENT)
Dept: LAB | Facility: CLINIC | Age: 30
End: 2020-02-14

## 2020-02-14 VITALS
BODY MASS INDEX: 19.93 KG/M2 | HEART RATE: 78 BPM | SYSTOLIC BLOOD PRESSURE: 126 MMHG | DIASTOLIC BLOOD PRESSURE: 98 MMHG | HEIGHT: 67 IN | WEIGHT: 127 LBS

## 2020-02-14 DIAGNOSIS — L72.3 INFECTED SEBACEOUS CYST: Primary | ICD-10-CM

## 2020-02-14 DIAGNOSIS — L08.9 INFECTED SEBACEOUS CYST: Primary | ICD-10-CM

## 2020-02-14 PROCEDURE — 87077 CULTURE AEROBIC IDENTIFY: CPT | Performed by: SURGERY

## 2020-02-14 PROCEDURE — 87070 CULTURE OTHR SPECIMN AEROBIC: CPT | Performed by: SURGERY

## 2020-02-14 PROCEDURE — 87076 CULTURE ANAEROBE IDENT EACH: CPT | Performed by: SURGERY

## 2020-02-14 PROCEDURE — 87186 SC STD MICRODIL/AGAR DIL: CPT | Performed by: SURGERY

## 2020-02-14 PROCEDURE — 87075 CULTR BACTERIA EXCEPT BLOOD: CPT | Performed by: SURGERY

## 2020-02-14 PROCEDURE — 10061 I&D ABSCESS COMP/MULTIPLE: CPT | Performed by: SURGERY

## 2020-02-14 RX ORDER — OXYCODONE AND ACETAMINOPHEN 5; 325 MG/1; MG/1
1 TABLET ORAL EVERY 6 HOURS PRN
Qty: 12 TABLET | Refills: 0 | Status: SHIPPED | OUTPATIENT
Start: 2020-02-14 | End: 2020-02-17

## 2020-02-14 RX ORDER — DOXYCYCLINE 100 MG/1
100 CAPSULE ORAL 2 TIMES DAILY
Qty: 28 CAPSULE | Refills: 1 | Status: SHIPPED | OUTPATIENT
Start: 2020-02-14 | End: 2021-11-05

## 2020-02-14 ASSESSMENT — MIFFLIN-ST. JEOR: SCORE: 1504.7

## 2020-02-14 NOTE — PATIENT INSTRUCTIONS
SKIN AND SUBCUTANEOUS SURGERY DISCHARGE INSTRUCTIONS  DR. MARCEL MATT    1. You may resume your regular diet when you feel you are ready to. DO NOT drink alcoholic beverages for 24 hours or while you are taking prescription medication.    2. Limit your activities for the first 48 hours. Gradually, increase them as tolerated. You may use stairs. I encourage you to walk as tolerated.     3. You will have some discomfort at the incision sites. This is expected. This should improve over the next 2-3 days. Ice and pain medication will help with this pain. Use prescribed pain medication as instructed.    4. Bruising and mild swelling is normal after surgery. The area below and around the incision(s) will be hard and elevated. This is normal. I call it the healing ridge. This will resolve slowly over the next several months. If you feel the pain is increasing and cannot explain it by increasing activity please call us at [phone].    5. The dressing will often have some blood on it. You may shower 24 hours after surgery. Clean gently over incision site. If clear plastic covering or steri-strip comes off and there is still some bleeding or drainage then cover with gauze or band-aid. If no bleeding, there is no reason to cover site. If you were given an abdominal binder at time of surgery it may be removed after 24 hours after surgery. You may continue to wear it however for comfort. I suggest  you wear an old t-shirt under the abdominal binder for a more comfortable wear.    6. Avoid Aspirin for the first 72 hours after the procedure. This medication may increase the tendency to bleed.    7. Use the following medications (in addition to your normal meds) as shown:  a. Percocet 5 mg 1-2 every 6 hours as needed for severe pain. This contains 325 mg of Tylenol (acetaminophen) per tablet.  Please do not take more than 4 grams of Tylenol (acetaminophen) per day. For example, you may take 1 Percocet and 1 Tylenol, or  2 Percocet and no Tylenol, or 2 Tylenol and no Percocet every 6 hours.  b. Tylenol (acetaminophen) 500 mg every 6 hours as needed for mild pain. Do not take more than 1000 mg every 6 hours. (see above).  c. Motrin (ibuprofen) 200-800 mg every 6 hours as needed for mild to moderate pain. Take with food.     8. Notify Dr. Anthony's clinic at [phone] if:    Your discomfort is not relieved by your pain medication.    You have signs of infection such as temperature above 100.5 degrees orally, chills, or increasing daily discomfort.    Incision site is becoming more red and/or there is purulent drainage.    You have questions or concerns.    9. Please call [phone] to schedule a follow up appointment in about 2 weeks.  If you have not already been given one.     10. When taking narcotics (pain medication more than Tylenol [acetaminophen] and Motrin [ibuprofen]) it is important to keep your stools soft to avoid constipation and pain with straining. This is best done by drinking fluids (non-alcoholic and non-caffeinated) and taking a stool softener (i.e. Metamucil or milk of magnesia). You may be able to use non-narcotics for pain relief especially by the 3rd post- operative day. Tylenol (acetaminophen) 500 mg every 6 hours and/or Motrin (ibuprofen) 200-800 mg every 6 hours. Please do not take more than 4 grams of Tylenol (acetaminophen) per day. Remember your Percocet does have Tylenol (acetaminophen) already in it. Please take Motrin (ibuprofen) with food to help protect the stomach. If you have a history of stomach ulcers or stomach problems, do not take Motrin (ibuprofen).     11. Do not drive or operate heavy machinery for 24 hours after surgery or when taking narcotics. You may resume driving when feel that you can safely avoid an accident and are not taking narcotics. This is usually 5 to 7 days after surgery. You should not be alone for 24 hours after surgery.    12. Have milk of magnesia available at  home so that when you take the pain medications you take 1-2 teaspoons a day,  to help reduce problems with constipation.

## 2020-02-14 NOTE — LETTER
"    2/14/2020         RE: Arcadio Adams  [address] [address]        Dear Colleague,    Thank you for referring your patient, Arcadio Adams, to the [facility]. Please see a copy of my visit note below.    Risks explained including bleeding, infection, scar and recurrence.      Patient has an infected posterior neck abscess where a sebaceous cyst is.     After sterile prep with betadine the area was infiltrated with local.  An incision was made over the area that appeared to be where the drainage was coming from.   The  incision is transverse I opened the entire length of the abscess site. Opening is about 3-4 cm.      Procedure  Preop dx:  Infected posterior neck sebaceous cyst  Post op dx:  Same  Procedure:   After sterile prep with betadine the area was infiltrated with local.  An incision was made over the area that appeared to be where the drainage was coming from.   The  incision is transverse I opened the entire length of the abscess site. Opening is about 3-4 cm.    Anesthesia:  2% lidocaine  with epinephrine   Est. blood loss: 5 cc  Patient tolerated procedure well.  Hemostasis obtained with pressure.  Follow up with me in 2 weeks.        And his right axilla has some drainage also even after being opened so will open it more today    After sterile prep with betadine the area was infiltrated with local.  An incision was made over the area that appeared to be where the drainage was coming from.   The other incision had been more transverse then one is more longitudinal.  I removed some of the cyst on the back wall.   I opened the entire length of the abscess site. Opening is about 2-3 cm.      BP (!) 126/98   Pulse 78   Ht 1.702 m (5' 7\")   Wt 57.6 kg (127 lb)   BMI 19.89 kg/m         Procedure  Preop dx:  Infected right axilla  Post op dx:  Same  Procedure:  After sterile prep with betadine the area was infiltrated with local.  An incision was made over the " "area that appeared to be where the drainage was coming from.   The other incision had been more transverse then one is more longitudinal.  I removed some of the cyst on the back wall.   I opened the entire length of the abscess site. Opening is about 2-3 cm.    Anesthesia:  2% lidocaine  with epinephrine   Est. blood loss: 5 cc  Patient tolerated procedure well.  Hemostasis obtained with pressure.  Follow up with me in 2 weeks.                       Amarjit Anthony MD   Surgery   Infected sebaceous cyst   Dx   Consult     Reason for Visit    Progress Notes        Risks explained including bleeding, infection, scar and recurrence.     Patient has In right axilla has a 2-3 cm sebaceous cyst no erythema at this time. But is draining and is  and patient was doing better on the doxycycline but came back after stopping it.  And did not restart it.   So we discussed retrying the antibiotics and then have it for 7 days before and 7 days after, or going to the OR and removing it and leaving it open or just doing and incision and drainage and leave open and then when it is small and closed can excise.      Patient want to just get it open and drained.      After sterile prep with betadine the area was infiltrated with local.  An incision was made starting at the inferior opening where there was some pus draining.  There was no sebaceous cyst looking oil or an obvious lining.  It is hard to tell for sure.  I did remove some skin in the middle of the incision to look for a cyst and to allow it to heal from the inside out.   /83   Pulse 78   Ht 1.702 m (5' 7\")   Wt 57.6 kg (127 lb)   BMI 19.89 kg/m    Procedure  Preop dx:  Infected sebaceous cyst not responding well to antibiotics.   Post op dx:  Not sure this was a sebaceous cyst as the drainage did not look like that  Procedure:  After sterile prep with betadine the area was infiltrated with local.  An incision was made starting at the inferior " "opening where there was some pus draining.  There was no sebaceous cyst looking oil or an obvious lining.  It is hard to tell for sure.  I did remove some skin in the middle of the incision to look for a cyst and to allow it to heal from the inside out.   Anesthesia:  2% lidocaine  with epinephrine   Est. blood loss: 5 cc  Patient tolerated procedure well.  Hemostasis obtained with pressure.  Follow up with me in 4-8  weeks.   We will see how this heals and if it does not show sebaceous cyst than perhaps we are done.           Amarjit Anthony MD   Surgery   Infected sebaceous cyst   Dx   Consult     Reason for Visit    Progress Notes     Expand All Collapse All    Dear Lavell Avery  I was asked to see this patient by Lavell Hand for please see below.  I have seen Arcadio Adams and as you know his chief complaint is sebaceous cyst under right axilla and is inflamed and infected.  Has been on antibiotics bactrim and went down and then came back.  Has drained a few times.        HPI:  Patient is a 29 year old male  with complaints see above  The patient noticed the symptoms about 4 months ago.    Patient has not family history of this problems  Patient has had one on his neck that was infected in the past  antibiotics makes the episode better.        Review Of Systems  Respiratory: No shortness of breath, dyspnea on exertion, cough, or hemoptysis  Cardiovascular: negative  Gastrointestinal: negative  Endocrine: negative  :  negative     10 Point review of systems all others are negative.   /89   Pulse 78   Ht 1.702 m (5' 7\")   Wt 57.6 kg (127 lb)   BMI 19.89 kg/m       Past Medical History        Past Medical History:   Diagnosis Date     Abscess 7/15/2013     Acne       Prolonged emergence from general anesthesia       and nausea            Past Surgical History         Past Surgical History:   Procedure Laterality Date     ENT SURGERY Right 10-20-16     I & D Neck abscess. " "Dr. Chen     EXTRACTION(S) DENTAL         wisdom     PE TUBES   age 6            Social History   Social History            Socioeconomic History     Marital status: Single       Spouse name: Not on file     Number of children: Not on file     Years of education: Not on file     Highest education level: Not on file   Occupational History     Not on file   Social Needs     Financial resource strain: Not on file     Food insecurity:       Worry: Not on file       Inability: Not on file     Transportation needs:       Medical: Not on file       Non-medical: Not on file   Tobacco Use     Smoking status: Never Smoker     Smokeless tobacco: Never Used   Substance and Sexual Activity     Alcohol use: Yes       Comment: occ     Drug use: No     Sexual activity: Not Currently       Partners: Female   Lifestyle     Physical activity:       Days per week: Not on file       Minutes per session: Not on file     Stress: Not on file   Relationships     Social connections:       Talks on phone: Not on file       Gets together: Not on file       Attends Mandaeism service: Not on file       Active member of club or organization: Not on file       Attends meetings of clubs or organizations: Not on file       Relationship status: Not on file     Intimate partner violence:       Fear of current or ex partner: Not on file       Emotionally abused: Not on file       Physically abused: Not on file       Forced sexual activity: Not on file   Other Topics Concern     Parent/sibling w/ CABG, MI or angioplasty before 65F 55M? Not Asked   Social History Narrative     Not on file            Current Outpatient Prescriptions   No current outpatient medications on file.            Above was reviewed  Physical exam: /89   Pulse 78   Ht 1.702 m (5' 7\")   Wt 57.6 kg (127 lb)   BMI 19.89 kg/m     Patient able to get up on table without difficulty.   Patient is alert and orientated.   Head eyes, nose and mouth within normal limits.  No " supraclavicular or cervical adenopathy palpated.  Thyroid within normal limits.  No carotid bruits auscultated.  Lungs are clear to auscultation  Heart is regular rate and rhythm with no murmur or thrills noted. Is tachy.   No costal vertebral angle tenderness noted.  Abdomen is abdomen is soft without significant tenderness, masses, organomegaly or guarding  bowel sounds are positive and no caput medusa noted.     Skin was warm and pink  Normal Affect        Lower extremity edema is not present.     In right axilla has a 2-3 cm sebaceous cyst no erythema at this time.  See scab from where it was draining.    Still feels tender and tissue is mildly inflamed.      Assessment: In right axilla has a 2-3 cm sebaceous cyst no erythema at this time.  See scab from where it was draining.    Still feels tender and tissue is mildly inflamed.    Plan to do        patient HAS Been improving on antibiotics and area is draining.  Discussed that  We can remove the cyst now but will have to leave it open. And may have to pack it.  Since antibiotics are working will have her [place] it out and then give another 14 days worth to take 7 days BEFORE SURGERY AND THEN 7 DAYS AFTER SURGERY TO REMOVE IT.   If not getting better may have to just remove and leave open.  If comes back before than will have patient restart the antibiotics.   Plan to do set up a time in 2-3 weeks to excise the cyst.  ..     Risks of surgery include damage to nerves, bleeding, infection, damage to  Vessels, recurrence.      Time spent with the patient with greater that 50% of the time in discussion was 30 minutes.  In discussing the plan.        Amarjit Anthony MD          Again, thank you for allowing me to participate in the care of your patient.        Sincerely,        Amarjit Anthony MD

## 2020-02-14 NOTE — LETTER
[facility]           [address] [place] [address]           Tel [phone]  Arcadio Adams  [address] [address]      February 21, 2020    Dear Arcadio,  This letter is to inform you of the results of your cultures that we did.   As we talked on the phone the main thing to remember is if the infection is getting worse we may need to change the antibiotics.    You are on doxycycline  as I thought this may be MRSA.  It is not.   The first culture does not really tell us what it is sensitive to.  But  the second cultures shows it sensitive to tetracycline that is a relative of the doxycycline.     If you have questions please feel free to call my assistant  At [phone] .    Your report was:  Anaerobic bacterial culture   Order: [id number]   Status:  Final result   Visible to patient:  No (Not Released) Dx:  Infected sebaceous cyst   Specimen Information: Neck        Component 7d ago   Specimen Description Neck Wound    Special Requests Received in anaerobic tubes.    Culture Micro Abnormal    Light growth   Cutibacterium (Propionibacterium) acnes     Culture Micro Susceptibility testing of Cutibacterium is not done from this source. Our antibiogram   indicates that Cutibacterium species are susceptible to penicillin and cefotaxime, and   most are susceptible to clindamycin.   Liz Saravia M.D., Medical Director    [facility]         Specimen Collected: 02/14/20  9:15 AM Last Resulted: 02/21/20 11:22 AM                     Contains abnormal data Wound Culture Aerobic Bacterial   Order: [id number]   Status:  Final result   Visible to patient:  Yes (MyChart) Dx:  Infected sebaceous cyst   Specimen Information: Neck        (important suggestion)  Newer results are available. Click to view them now.   Component 7d ago   Specimen Description Neck Wound    Special Requests Specimen collected in [facility]  transport (white cap)    Culture Micro Abnormal    Light growth   Staphylococcus lugdunensis     Resulting Agency [facility]   Susceptibility     Staphylococcus lugdunensis (1)     Antibiotic Interpretation Sensitivity Method Status   CLINDAMYCIN Sensitive <=0.25 ug/mL DRISS Final   ERYTHROMYCIN Sensitive <=0.25 ug/mL DRISS Final   GENTAMICIN Sensitive <=0.5 ug/mL DRISS Final   OXACILLIN Sensitive 0.5 ug/mL DRISS Final   TETRACYCLINE Sensitive <=1 ug/mL DRISS Final   Trimethoprim/Sulfa Sensitive <=0.5/9.5 ug/mL DRISS Final   VANCOMYCIN Sensitive <=0.5 ug/mL DRISS Final         Specimen Collected: 02/14/20  9:15 AM Last Resulted: 02/18/20 12:47 AM                    If you do have further questions please don t hesitate to call my assistant at  .  We do not have someone answering the phone all the time at my assistants number so if leave a message may take a day or so to get back to you.  So if more urgent then call the below number.    To make an appointment call [phone]: .   Sincerely,     Amarjit Anthony M.D.  ___

## 2020-02-14 NOTE — PROGRESS NOTES
"Risks explained including bleeding, infection, scar and recurrence.      Patient has an infected posterior neck abscess where a sebaceous cyst is.     After sterile prep with betadine the area was infiltrated with local.  An incision was made over the area that appeared to be where the drainage was coming from.   The  incision is transverse I opened the entire length of the abscess site. Opening is about 3-4 cm.      Procedure  Preop dx:  Infected posterior neck sebaceous cyst  Post op dx:  Same  Procedure:   After sterile prep with betadine the area was infiltrated with local.  An incision was made over the area that appeared to be where the drainage was coming from.   The  incision is transverse I opened the entire length of the abscess site. Opening is about 3-4 cm.    Anesthesia:  2% lidocaine  with epinephrine   Est. blood loss: 5 cc  Patient tolerated procedure well.  Hemostasis obtained with pressure.  Follow up with me in 2 weeks.        And his right axilla has some drainage also even after being opened so will open it more today    After sterile prep with betadine the area was infiltrated with local.  An incision was made over the area that appeared to be where the drainage was coming from.   The other incision had been more transverse then one is more longitudinal.  I removed some of the cyst on the back wall.   I opened the entire length of the abscess site. Opening is about 2-3 cm.      BP (!) 126/98   Pulse 78   Ht 1.702 m (5' 7\")   Wt 57.6 kg (127 lb)   BMI 19.89 kg/m        Procedure  Preop dx:  Infected right axilla  Post op dx:  Same  Procedure:  After sterile prep with betadine the area was infiltrated with local.  An incision was made over the area that appeared to be where the drainage was coming from.   The other incision had been more transverse then one is more longitudinal.  I removed some of the cyst on the back wall.   I opened the entire length of the abscess site. Opening is about " "2-3 cm.    Anesthesia:  2% lidocaine  with epinephrine   Est. blood loss: 5 cc  Patient tolerated procedure well.  Hemostasis obtained with pressure.  Follow up with me in 2 weeks.                       Amarjit Anthony MD   Surgery   Infected sebaceous cyst   Dx   Consult     Reason for Visit    Progress Notes        Risks explained including bleeding, infection, scar and recurrence.     Patient has In right axilla has a 2-3 cm sebaceous cyst no erythema at this time. But is draining and is  and patient was doing better on the doxycycline but came back after stopping it.  And did not restart it.   So we discussed retrying the antibiotics and then have it for 7 days before and 7 days after, or going to the OR and removing it and leaving it open or just doing and incision and drainage and leave open and then when it is small and closed can excise.      Patient want to just get it open and drained.      After sterile prep with betadine the area was infiltrated with local.  An incision was made starting at the inferior opening where there was some pus draining.  There was no sebaceous cyst looking oil or an obvious lining.  It is hard to tell for sure.  I did remove some skin in the middle of the incision to look for a cyst and to allow it to heal from the inside out.   /83   Pulse 78   Ht 1.702 m (5' 7\")   Wt 57.6 kg (127 lb)   BMI 19.89 kg/m    Procedure  Preop dx:  Infected sebaceous cyst not responding well to antibiotics.   Post op dx:  Not sure this was a sebaceous cyst as the drainage did not look like that  Procedure:  After sterile prep with betadine the area was infiltrated with local.  An incision was made starting at the inferior opening where there was some pus draining.  There was no sebaceous cyst looking oil or an obvious lining.  It is hard to tell for sure.  I did remove some skin in the middle of the incision to look for a cyst and to allow it to heal from the inside out. " "  Anesthesia:  2% lidocaine  with epinephrine   Est. blood loss: 5 cc  Patient tolerated procedure well.  Hemostasis obtained with pressure.  Follow up with me in 4-8  weeks.   We will see how this heals and if it does not show sebaceous cyst than perhaps we are done.           Amarjit Anthony MD   Surgery   Infected sebaceous cyst   Dx   Consult     Reason for Visit    Progress Notes     Expand All Collapse All    Dear Lavell Avery  I was asked to see this patient by Lavell Hand for please see below.  I have seen Arcadio Adams and as you know his chief complaint is sebaceous cyst under right axilla and is inflamed and infected.  Has been on antibiotics bactrim and went down and then came back.  Has drained a few times.        HPI:  Patient is a 29 year old male  with complaints see above  The patient noticed the symptoms about 4 months ago.    Patient has not family history of this problems  Patient has had one on his neck that was infected in the past  antibiotics makes the episode better.        Review Of Systems  Respiratory: No shortness of breath, dyspnea on exertion, cough, or hemoptysis  Cardiovascular: negative  Gastrointestinal: negative  Endocrine: negative  :  negative     10 Point review of systems all others are negative.   /89   Pulse 78   Ht 1.702 m (5' 7\")   Wt 57.6 kg (127 lb)   BMI 19.89 kg/m       Past Medical History        Past Medical History:   Diagnosis Date     Abscess 7/15/2013     Acne       Prolonged emergence from general anesthesia       and nausea            Past Surgical History   Past Surgical History:   Procedure Laterality Date     ENT SURGERY Right 10-20-16     I & D Neck abscess. Dr. Chen     EXTRACTION(S) DENTAL         wisdom     PE TUBES   age 6            Social History   Social History            Socioeconomic History     Marital status: Single       Spouse name: Not on file     Number of children: Not on file     Years of " "education: Not on file     Highest education level: Not on file   Occupational History     Not on file   Social Needs     Financial resource strain: Not on file     Food insecurity:       Worry: Not on file       Inability: Not on file     Transportation needs:       Medical: Not on file       Non-medical: Not on file   Tobacco Use     Smoking status: Never Smoker     Smokeless tobacco: Never Used   Substance and Sexual Activity     Alcohol use: Yes       Comment: occ     Drug use: No     Sexual activity: Not Currently       Partners: Female   Lifestyle     Physical activity:       Days per week: Not on file       Minutes per session: Not on file     Stress: Not on file   Relationships     Social connections:       Talks on phone: Not on file       Gets together: Not on file       Attends Yarsanism service: Not on file       Active member of club or organization: Not on file       Attends meetings of clubs or organizations: Not on file       Relationship status: Not on file     Intimate partner violence:       Fear of current or ex partner: Not on file       Emotionally abused: Not on file       Physically abused: Not on file       Forced sexual activity: Not on file   Other Topics Concern     Parent/sibling w/ CABG, MI or angioplasty before 65F 55M? Not Asked   Social History Narrative     Not on file            Current Outpatient Prescriptions   No current outpatient medications on file.            Above was reviewed  Physical exam: /89   Pulse 78   Ht 1.702 m (5' 7\")   Wt 57.6 kg (127 lb)   BMI 19.89 kg/m     Patient able to get up on table without difficulty.   Patient is alert and orientated.   Head eyes, nose and mouth within normal limits.  No supraclavicular or cervical adenopathy palpated.  Thyroid within normal limits.  No carotid bruits auscultated.  Lungs are clear to auscultation  Heart is regular rate and rhythm with no murmur or thrills noted. Is tachy.   No costal vertebral angle tenderness " noted.  Abdomen is abdomen is soft without significant tenderness, masses, organomegaly or guarding  bowel sounds are positive and no caput medusa noted.     Skin was warm and pink  Normal Affect        Lower extremity edema is not present.     In right axilla has a 2-3 cm sebaceous cyst no erythema at this time.  See scab from where it was draining.    Still feels tender and tissue is mildly inflamed.      Assessment: In right axilla has a 2-3 cm sebaceous cyst no erythema at this time.  See scab from where it was draining.    Still feels tender and tissue is mildly inflamed.    Plan to do        patient HAS Been improving on antibiotics and area is draining.  Discussed that  We can remove the cyst now but will have to leave it open. And may have to pack it.  Since antibiotics are working will have her [place] it out and then give another 14 days worth to take 7 days BEFORE SURGERY AND THEN 7 DAYS AFTER SURGERY TO REMOVE IT.   If not getting better may have to just remove and leave open.  If comes back before than will have patient restart the antibiotics.   Plan to do set up a time in 2-3 weeks to excise the cyst.  ..     Risks of surgery include damage to nerves, bleeding, infection, damage to  Vessels, recurrence.      Time spent with the patient with greater that 50% of the time in discussion was 30 minutes.  In discussing the plan.        Amarjit Anthony MD

## 2020-02-14 NOTE — PROGRESS NOTES
Called Md to confirm lab orders. Place VO from MD Yanni Bowie, RN Specialty Triage 2/14/2020 3:16 PM

## 2020-02-18 LAB
BACTERIA SPEC CULT: ABNORMAL
Lab: ABNORMAL
SPECIMEN SOURCE: ABNORMAL

## 2020-02-21 LAB
BACTERIA SPEC CULT: ABNORMAL
BACTERIA SPEC CULT: ABNORMAL
Lab: ABNORMAL
SPECIMEN SOURCE: ABNORMAL

## 2020-02-23 ENCOUNTER — HEALTH MAINTENANCE LETTER (OUTPATIENT)
Age: 30
End: 2020-02-23

## 2020-12-06 ENCOUNTER — HEALTH MAINTENANCE LETTER (OUTPATIENT)
Age: 30
End: 2020-12-06

## 2021-04-11 ENCOUNTER — HEALTH MAINTENANCE LETTER (OUTPATIENT)
Age: 31
End: 2021-04-11

## 2021-09-26 ENCOUNTER — HEALTH MAINTENANCE LETTER (OUTPATIENT)
Age: 31
End: 2021-09-26

## 2021-10-19 ENCOUNTER — OFFICE VISIT (OUTPATIENT)
Dept: FAMILY MEDICINE | Facility: CLINIC | Age: 31
End: 2021-10-19
Payer: COMMERCIAL

## 2021-10-19 VITALS
HEART RATE: 103 BPM | OXYGEN SATURATION: 98 % | HEIGHT: 68 IN | WEIGHT: 128.8 LBS | SYSTOLIC BLOOD PRESSURE: 132 MMHG | RESPIRATION RATE: 28 BRPM | DIASTOLIC BLOOD PRESSURE: 84 MMHG | TEMPERATURE: 98.5 F | BODY MASS INDEX: 19.52 KG/M2

## 2021-10-19 DIAGNOSIS — Z23 NEED FOR PROPHYLACTIC VACCINATION AND INOCULATION AGAINST INFLUENZA: ICD-10-CM

## 2021-10-19 DIAGNOSIS — L72.3 SEBACEOUS CYST: Primary | ICD-10-CM

## 2021-10-19 PROCEDURE — 99213 OFFICE O/P EST LOW 20 MIN: CPT | Mod: 25 | Performed by: FAMILY MEDICINE

## 2021-10-19 PROCEDURE — 90686 IIV4 VACC NO PRSV 0.5 ML IM: CPT | Performed by: FAMILY MEDICINE

## 2021-10-19 PROCEDURE — 90471 IMMUNIZATION ADMIN: CPT | Performed by: FAMILY MEDICINE

## 2021-10-19 ASSESSMENT — PAIN SCALES - GENERAL: PAINLEVEL: NO PAIN (0)

## 2021-10-19 ASSESSMENT — MIFFLIN-ST. JEOR: SCORE: 1514.24

## 2021-10-19 NOTE — PROGRESS NOTES
Assessment & Plan     Sebaceous cyst  Was opened, I squeezed material out  Follow up in 2- 4 weeks, if developed more swelling.  - amoxicillin-clavulanate (AUGMENTIN) 875-125 MG tablet; Take 1 tablet by mouth 2 times daily for 10 days        Return in about 4 weeks (around 11/16/2021) for Routine preventive.    Aguila Borrego MD  [facility] 31 year [facility] who presents for the following health issues:  History of sebaceous cyst.  He has 1 in his upper back which been opened and been draining for the past 3 to 4 weeks.  Patient reports in the past he had some been draining.  He does have another one in his right neck area.      Skin Lesion  Onset/Duration: three to four weeks ago  Description  Location: back, right side of neck, and right armpit.  Color: skin colored  Border description: raised, scaly, inflamed, opened and draining  Character: round, raised, painful, red, blanches to palpitation  Itching: no  Bleeding:  no  Intensity:  severe  Progression of Symptoms:  worsening and constant  Accompanying signs and symptoms:   Bleeding: no  Scaling: no  Excessive sun exposure/tanning: no  Sunscreen used: no  History:           Any previous history of skin cancer: no  Any family history of melanoma: no  Previous episodes of similar lesion: no  Precipitating or alleviating factors: none  Therapies tried and outcome: none    Review of Systems   Constitutional, HEENT, cardiovascular, pulmonary, gi and gu systems are negative, except as otherwise noted.      Objective    There were no vitals taken for this visit.  There is no height or weight on file to calculate BMI.  Physical Exam   GENERAL: healthy, alert and no distress  Skin exam: upper back an open sebaceous cyst, with filled material,   Right neck area, intact cyst.      Aguila Borrego MD

## 2021-11-05 ENCOUNTER — OFFICE VISIT (OUTPATIENT)
Dept: FAMILY MEDICINE | Facility: CLINIC | Age: 31
End: 2021-11-05
Payer: COMMERCIAL

## 2021-11-05 VITALS
DIASTOLIC BLOOD PRESSURE: 88 MMHG | OXYGEN SATURATION: 99 % | BODY MASS INDEX: 19.47 KG/M2 | HEART RATE: 92 BPM | WEIGHT: 127 LBS | TEMPERATURE: 97.3 F | RESPIRATION RATE: 28 BRPM | SYSTOLIC BLOOD PRESSURE: 128 MMHG

## 2021-11-05 DIAGNOSIS — L72.3 SEBACEOUS CYST: Primary | ICD-10-CM

## 2021-11-05 PROCEDURE — 99213 OFFICE O/P EST LOW 20 MIN: CPT | Performed by: FAMILY MEDICINE

## 2021-11-05 ASSESSMENT — PAIN SCALES - GENERAL: PAINLEVEL: NO PAIN (0)

## 2021-11-05 NOTE — PROGRESS NOTES
Assessment & Plan     Sebaceous cyst  He has multiple cyst, upper back, right armpit, right neck side.    His upper back looks better, continue to have minimal inflammation, tenderness.  In addition, under right armpit been inflamed.    I did offer to excise  the cyst on his upper back, and  right armpit.  However, at this point he has declined.  Continue with warm compressors.    - amoxicillin-clavulanate (AUGMENTIN) 875-125 MG tablet; Take 1 tablet by mouth 2 times daily      Return in about 6 months (around 5/5/2022) for Routine preventive, in person.    Aguila Borrego MD  [facility]    Sophia Rodgers is a 31 year old who presents for the following health issues   Patient is following for a cyst removal two weeks ago.  Multiple sebaceous cysts, different location.  He had an open wound in his upper back, was treated with Augmentin 2 weeks ago.  He reports the medicine did help, he has no drainage, no pain, no tenderness.    He started having a small  under his right armpit, which has minimal drainage.    Patient does not want to have any excisions to  any of his sister at this point      Review of Systems   Constitutional, HEENT, cardiovascular, pulmonary, gi and gu systems are negative, except as otherwise noted.      Objective    There were no vitals taken for this visit.  There is no height or weight on file to calculate BMI.  Physical Exam   GENERAL: healthy, alert and no distress  SKIN: cysts,   Right neck closed, non tender cyst  Upper back, small area of retention, redness, small open area, no discharges.  Right under armpit, minimal tenderness to small cyst.  No discharges  PSYCH: mentation appears normal, affect normal/bright      Aguila Borrego MD.

## 2022-01-17 ENCOUNTER — OFFICE VISIT (OUTPATIENT)
Dept: FAMILY MEDICINE | Facility: CLINIC | Age: 32
End: 2022-01-17
Payer: COMMERCIAL

## 2022-01-17 VITALS
BODY MASS INDEX: 19.53 KG/M2 | TEMPERATURE: 98.7 F | HEART RATE: 89 BPM | SYSTOLIC BLOOD PRESSURE: 127 MMHG | OXYGEN SATURATION: 98 % | DIASTOLIC BLOOD PRESSURE: 75 MMHG | WEIGHT: 127.4 LBS

## 2022-01-17 DIAGNOSIS — J01.90 ACUTE SINUSITIS WITH SYMPTOMS > 10 DAYS: Primary | ICD-10-CM

## 2022-01-17 DIAGNOSIS — H54.7 DECREASED VISUAL ACUITY: ICD-10-CM

## 2022-01-17 DIAGNOSIS — L72.3 INFLAMED SEBACEOUS CYST: ICD-10-CM

## 2022-01-17 PROCEDURE — 99214 OFFICE O/P EST MOD 30 MIN: CPT | Performed by: FAMILY MEDICINE

## 2022-01-17 NOTE — PROGRESS NOTES
"  Assessment & Plan     (J01.90) Acute sinusitis with symptoms > 10 days  (primary encounter diagnosis)  Comment:   Plan: amoxicillin-clavulanate (AUGMENTIN) 875-125 MG         tablet         Return in about 2 weeks (around 1/31/2022) for recheck if symptoms fail to resolve by then.      (H54.7) Decreased visual acuity  Comment:   Plan: Adult Eye Referral            (L72.3) Inflamed sebaceous cysts of head and neck  Comment: I believe at least 1 of these lesions needs to be excised  Plan: Adult General Surg Referral              Lavell Hand MD  [facility]    Sophia Rodgers is a 31 year old who presents for the following health issues     HPI         \"SINUS\" RESPIRATORY SYMPTOMS      Duration: about 3 weeks    Description:  Headache centered around RT eye, pain near RT ear at times when jaw is flexed a certain way. RT eye vision seems a little hazy at times  facial pain/pressure, ear pain right and headache    Severity: mild    Accompanying signs and symptoms: None    History (predisposing factors):  None    Precipitating or alleviating factors: None, no preceding URI that he remembers    Therapies tried and outcome:  Tylenol when h/a worse     Review of Systems   Constitutional, HEENT, cardiovascular, pulmonary, gi and gu systems are negative, except as otherwise noted.      Objective    /75 (BP Location: Left arm, Patient Position: Sitting, Cuff Size: Adult Regular)   Pulse 89   Temp 98.7  F (37.1  C) (Oral)   Wt 57.8 kg (127 lb 6.4 oz)   SpO2 98%   BMI 19.53 kg/m    Body mass index is 19.53 kg/m .  Physical Exam   GENERAL: healthy, alert and no distress  EYES: Eyes grossly normal to inspection, PERRL and conjunctivae and sclerae normal  HENT: ear canals and TM's normal, nose and mouth without ulcers or lesions  NECK: no adenopathy, no asymmetry, masses, or scars and thyroid normal to palpation  SKIN: Large sebaceous cyst in the right submandibular region.  The " smaller recurrent sebaceous cyst at the right occipital hairline (where his previous cyst was treated or excised)

## 2022-01-17 NOTE — PATIENT INSTRUCTIONS
At [facility], we strive to deliver an exceptional experience to you, every time we see you. If you receive a survey, please complete it as we do value your feedback.  If you have MyChart, you can expect to receive results automatically within 24 hours of their completion.  Your provider will send a note interpreting your results as well.   If you do not have MyChart, you should receive your results in about a week by mail.    Your care team:                            Family Medicine Internal Medicine   MD Yaya Cervantes MD Shantel Branch-Fleming, MD Srinivasa Vaka, MD Katya Belousova, PATRELL Rush, APRN CNP    Arsenio Mai, MD Pediatrics   Armando Cardona, PATRELL Galicia, CNP MD Racquel Jacobsen APRN CNP   MD Kelley Rutherford MD Deborah Mielke, MD Connie Brown, APRN [facility]      Clinic hours: Monday - Thursday 7 am-6 pm; Fridays 7 am-5 pm.   Urgent care: Monday - Friday 10 am- 8 pm; Saturday and Sunday 9 am-5 pm.    Clinic: [phone]       [facility]: Monday - Thursday 8 am - 7 pm; Friday 8 am - 6 pm  [facility]: [phone]     Use [email or web address] for 24/7 diagnosis and treatment of dozens of conditions.

## 2022-05-07 ENCOUNTER — HEALTH MAINTENANCE LETTER (OUTPATIENT)
Age: 32
End: 2022-05-07

## 2022-05-09 ENCOUNTER — APPOINTMENT (OUTPATIENT)
Dept: URBAN - METROPOLITAN AREA CLINIC 259 | Age: 32
Setting detail: DERMATOLOGY
End: 2022-05-10

## 2022-05-09 DIAGNOSIS — L0293 CARBUNCLE AND FURUNCLE OF UNSPECIFIED SITE: ICD-10-CM

## 2022-05-09 DIAGNOSIS — L0292 CARBUNCLE AND FURUNCLE OF UNSPECIFIED SITE: ICD-10-CM

## 2022-05-09 DIAGNOSIS — L0390 CELLULITIS AND ABSCESS OF UNSPECIFIED SITES: ICD-10-CM

## 2022-05-09 DIAGNOSIS — L0391 CELLULITIS AND ABSCESS OF UNSPECIFIED SITES: ICD-10-CM

## 2022-05-09 PROBLEM — L02.13 CARBUNCLE OF NECK: Status: ACTIVE | Noted: 2022-05-09

## 2022-05-09 PROBLEM — L02.11 CUTANEOUS ABSCESS OF NECK: Status: ACTIVE | Noted: 2022-05-09

## 2022-05-09 PROCEDURE — OTHER COUNSELING: OTHER

## 2022-05-09 PROCEDURE — OTHER PRESCRIPTION: OTHER

## 2022-05-09 PROCEDURE — OTHER MIPS QUALITY: OTHER

## 2022-05-09 PROCEDURE — OTHER INCISION AND DRAINAGE: OTHER

## 2022-05-09 PROCEDURE — 10060 I&D ABSCESS SIMPLE/SINGLE: CPT

## 2022-05-09 PROCEDURE — 99203 OFFICE O/P NEW LOW 30 MIN: CPT | Mod: 25

## 2022-05-09 RX ORDER — CEPHALEXIN 500 MG/1
CAPSULE ORAL
Qty: 20 | Refills: 0 | Status: ERX | COMMUNITY
Start: 2022-05-09

## 2022-05-09 ASSESSMENT — LOCATION DETAILED DESCRIPTION DERM: LOCATION DETAILED: RIGHT CENTRAL LATERAL NECK

## 2022-05-09 ASSESSMENT — LOCATION SIMPLE DESCRIPTION DERM: LOCATION SIMPLE: NECK

## 2022-05-09 ASSESSMENT — LOCATION ZONE DERM: LOCATION ZONE: NECK

## 2022-05-09 NOTE — PROCEDURE: COUNSELING
Detail Level: Simple
Patient Specific Counseling (Will Not Stick From Patient To Patient): \\nWe discussed excision of cyst if it reforms after drainage and healing time to prevent future infection/rupture.  Patient will consider this in the future, schedule 45 minutes.

## 2022-05-09 NOTE — HPI: CYST
How Severe Is Your Cyst?: mild
Is This A New Presentation, Or A Follow-Up?: Cyst
Additional History: The pt has been dealing with cysts his whole life and has had cysts in other places.

## 2022-05-09 NOTE — PROCEDURE: INCISION AND DRAINAGE
Lesion Type: Abscess
Drainage Type?: bloody and cyst-like
Post-Care Instructions: I reviewed with the patient in detail post-care instructions. Patient should keep wound covered and call the office should any redness, pain, swelling or worsening occur.
Curette Text (Optional): After the contents were expressed a curette was used to partially remove the cyst wall.
Detail Level: Detailed
Anesthesia Type: 1% lidocaine with epinephrine
Size Of Lesion In Cm (Optional But May Be Required For Some Insurances): 0
Dressing: dry sterile dressing
Curette: No
Epidermal Sutures: 4-0 Ethilon
Suture Text: The incision was partially closed with
Consent was obtained and risks were reviewed including but not limited to delayed wound healing, infection, need for multiple I and D's, and pain.
Method: 11 blade
Drainage Amount?: moderate
Wound Care: Petrolatum
Epidermal Closure: simple interrupted
Preparation Text: The area was prepped in the usual clean fashion.

## 2023-04-23 ENCOUNTER — HEALTH MAINTENANCE LETTER (OUTPATIENT)
Age: 33
End: 2023-04-23

## 2023-06-02 ENCOUNTER — HEALTH MAINTENANCE LETTER (OUTPATIENT)
Age: 33
End: 2023-06-02

## 2024-01-11 ENCOUNTER — APPOINTMENT (OUTPATIENT)
Dept: URBAN - METROPOLITAN AREA CLINIC 257 | Age: 34
Setting detail: DERMATOLOGY
End: 2024-01-15

## 2024-01-11 DIAGNOSIS — L73.2 HIDRADENITIS SUPPURATIVA: ICD-10-CM

## 2024-01-11 PROCEDURE — OTHER PRESCRIPTION MEDICATION MANAGEMENT: OTHER

## 2024-01-11 PROCEDURE — OTHER MIPS QUALITY: OTHER

## 2024-01-11 PROCEDURE — 99213 OFFICE O/P EST LOW 20 MIN: CPT

## 2024-01-11 PROCEDURE — OTHER DEFER: OTHER

## 2024-01-11 PROCEDURE — OTHER PRESCRIPTION: OTHER

## 2024-01-11 PROCEDURE — OTHER COUNSELING: OTHER

## 2024-01-11 RX ORDER — CLINDAMYCIN PHOSPHATE 10 MG/G
GEL TOPICAL QD-BID
Qty: 60 | Refills: 2 | Status: ERX | COMMUNITY
Start: 2024-01-11

## 2024-01-11 ASSESSMENT — LOCATION SIMPLE DESCRIPTION DERM
LOCATION SIMPLE: POSTERIOR NECK
LOCATION SIMPLE: RIGHT ANTERIOR NECK

## 2024-01-11 ASSESSMENT — LOCATION ZONE DERM: LOCATION ZONE: NECK

## 2024-01-11 ASSESSMENT — LOCATION DETAILED DESCRIPTION DERM
LOCATION DETAILED: RIGHT SUPERIOR POSTERIOR NECK
LOCATION DETAILED: RIGHT SUPERIOR ANTERIOR NECK

## 2024-01-11 NOTE — PROCEDURE: COUNSELING
Patient Specific Counseling (Will Not Stick From Patient To Patient): - HS can stay stay mild, or may worsen over time. \\n- Antibiotic wash and gel can help, or oral antibiotic. \\n- Recommended Pan Oxyl wash- cleanse affected areas daily.  \\n- Clindamycin gel- apply daily to affected areas, whether there is a cyst present or not.
Detail Level: Detailed

## 2024-01-11 NOTE — PROCEDURE: DEFER
Introduction Text (Please End With A Colon): The following procedure was deferred:
Size Of Lesion In Cm (Optional): 0
Procedure To Be Performed At Next Visit: Excision
Detail Level: Zone
Scheduling Instructions (Optional): 60 min for 2 cysts on neck

## 2024-01-11 NOTE — HPI: CYST
Is This A New Presentation, Or A Follow-Up?: Cysts
Additional History: Not painful and inflamed today, but occasionally become painful and inflamed. Ranulfo also has a cyst on the back that he is not bothered by. He says he has had cysts in the armpits and groin in the past.

## 2024-02-21 ENCOUNTER — APPOINTMENT (OUTPATIENT)
Dept: URBAN - METROPOLITAN AREA CLINIC 259 | Age: 34
Setting detail: DERMATOLOGY
End: 2024-02-22

## 2024-02-21 DIAGNOSIS — D49.2 NEOPLASM OF UNSPECIFIED BEHAVIOR OF BONE, SOFT TISSUE, AND SKIN: ICD-10-CM

## 2024-02-21 PROCEDURE — OTHER MIPS QUALITY: OTHER

## 2024-02-21 PROCEDURE — 11422 EXC H-F-NK-SP B9+MARG 1.1-2: CPT

## 2024-02-21 PROCEDURE — OTHER COUNSELING: OTHER

## 2024-02-21 PROCEDURE — 11422 EXC H-F-NK-SP B9+MARG 1.1-2: CPT | Mod: 76

## 2024-02-21 PROCEDURE — 13132 CMPLX RPR F/C/C/M/N/AX/G/H/F: CPT

## 2024-02-21 PROCEDURE — OTHER EXCISION: OTHER

## 2024-02-21 ASSESSMENT — LOCATION DETAILED DESCRIPTION DERM
LOCATION DETAILED: RIGHT SUPERIOR POSTERIOR NECK
LOCATION DETAILED: RIGHT CENTRAL LATERAL NECK

## 2024-02-21 ASSESSMENT — LOCATION ZONE DERM: LOCATION ZONE: NECK

## 2024-02-21 ASSESSMENT — LOCATION SIMPLE DESCRIPTION DERM: LOCATION SIMPLE: NECK

## 2024-02-21 NOTE — PROCEDURE: EXCISION

## 2024-03-01 ENCOUNTER — RX ONLY (RX ONLY)
Age: 34
End: 2024-03-01

## 2024-03-01 ENCOUNTER — APPOINTMENT (OUTPATIENT)
Dept: URBAN - METROPOLITAN AREA CLINIC 259 | Age: 34
Setting detail: DERMATOLOGY
End: 2024-03-03

## 2024-03-01 DIAGNOSIS — Z48.02 ENCOUNTER FOR REMOVAL OF SUTURES: ICD-10-CM

## 2024-03-01 DIAGNOSIS — L08.9 LOCAL INFECTION OF THE SKIN AND SUBCUTANEOUS TISSUE, UNSPECIFIED: ICD-10-CM

## 2024-03-01 PROCEDURE — 99212 OFFICE O/P EST SF 10 MIN: CPT | Mod: 24

## 2024-03-01 PROCEDURE — OTHER PRESCRIPTION MEDICATION MANAGEMENT: OTHER

## 2024-03-01 PROCEDURE — OTHER PRESCRIPTION: OTHER

## 2024-03-01 PROCEDURE — OTHER SUTURE REMOVAL (GLOBAL PERIOD): OTHER

## 2024-03-01 PROCEDURE — OTHER COUNSELING: OTHER

## 2024-03-01 PROCEDURE — OTHER ADDITIONAL NOTES: OTHER

## 2024-03-01 RX ORDER — CEPHALEXIN 500 MG/1
TABLET ORAL
Qty: 14 | Refills: 0 | Status: CANCELLED
Stop reason: CLARIF

## 2024-03-01 RX ORDER — CEPHALEXIN 500 MG/1
CAPSULE ORAL
Qty: 14 | Refills: 0 | COMMUNITY
Start: 2024-03-01

## 2024-03-01 ASSESSMENT — LOCATION ZONE DERM: LOCATION ZONE: NECK

## 2024-03-01 ASSESSMENT — LOCATION DETAILED DESCRIPTION DERM
LOCATION DETAILED: RIGHT SUPERIOR POSTERIOR NECK
LOCATION DETAILED: RIGHT CENTRAL LATERAL NECK

## 2024-03-01 ASSESSMENT — LOCATION SIMPLE DESCRIPTION DERM: LOCATION SIMPLE: NECK

## 2024-03-01 NOTE — PROCEDURE: PRESCRIPTION MEDICATION MANAGEMENT
Initiate Treatment: cephalexin 500 mg PO BID X 7 days
Render In Strict Bullet Format?: No
Detail Level: Zone

## 2024-03-01 NOTE — PROCEDURE: SUTURE REMOVAL (GLOBAL PERIOD)
Detail Level: Detailed
Add 29757 Cpt? (Important Note: In 2017 The Use Of 56809 Is Being Tracked By Cms To Determine Future Global Period Reimbursement For Global Periods): no

## 2024-03-01 NOTE — PROCEDURE: ADDITIONAL NOTES
Detail Level: Simple
Render Risk Assessment In Note?: no
Additional Notes: - Advised patient to continue cleaning healing surgical sites with hydrogen peroxide. Additionally, to continue to apply Vaseline and new bandaid daily until completely healed

## 2024-06-29 ENCOUNTER — HEALTH MAINTENANCE LETTER (OUTPATIENT)
Age: 34
End: 2024-06-29

## 2025-03-12 ENCOUNTER — APPOINTMENT (OUTPATIENT)
Dept: URBAN - METROPOLITAN AREA CLINIC 254 | Age: 35
Setting detail: DERMATOLOGY
End: 2025-03-17

## 2025-03-12 VITALS — HEIGHT: 60 IN | WEIGHT: 150 LBS

## 2025-03-12 DIAGNOSIS — L08.9 LOCAL INFECTION OF THE SKIN AND SUBCUTANEOUS TISSUE, UNSPECIFIED: ICD-10-CM

## 2025-03-12 PROCEDURE — OTHER MIPS QUALITY: OTHER

## 2025-03-12 PROCEDURE — OTHER PRESCRIPTION: OTHER

## 2025-03-12 PROCEDURE — 99213 OFFICE O/P EST LOW 20 MIN: CPT

## 2025-03-12 PROCEDURE — OTHER COUNSELING: OTHER

## 2025-03-12 RX ORDER — MUPIROCIN 20 MG/G
OINTMENT TOPICAL BID
Qty: 15 | Refills: 0 | Status: ERX | COMMUNITY
Start: 2025-03-12

## 2025-03-12 ASSESSMENT — LOCATION SIMPLE DESCRIPTION DERM: LOCATION SIMPLE: RIGHT FOREARM

## 2025-03-12 ASSESSMENT — LOCATION DETAILED DESCRIPTION DERM: LOCATION DETAILED: RIGHT VENTRAL DISTAL FOREARM

## 2025-03-12 ASSESSMENT — LOCATION ZONE DERM: LOCATION ZONE: ARM

## 2025-03-12 NOTE — PROCEDURE: COUNSELING
Detail Level: Detailed
Patient Specific Counseling (Will Not Stick From Patient To Patient): **discussed possibility of underlying nummular eczema contributing to irritation. However, recommend treating with topical antibiotic given concern for impetigo.

## 2025-03-26 ENCOUNTER — APPOINTMENT (OUTPATIENT)
Dept: URBAN - METROPOLITAN AREA CLINIC 254 | Age: 35
Setting detail: DERMATOLOGY
End: 2025-03-27

## 2025-03-26 VITALS — HEIGHT: 68 IN | WEIGHT: 150 LBS

## 2025-03-26 DIAGNOSIS — L30.0 NUMMULAR DERMATITIS: ICD-10-CM

## 2025-03-26 DIAGNOSIS — L08.9 LOCAL INFECTION OF THE SKIN AND SUBCUTANEOUS TISSUE, UNSPECIFIED: ICD-10-CM

## 2025-03-26 PROCEDURE — OTHER MIPS QUALITY: OTHER

## 2025-03-26 PROCEDURE — 99213 OFFICE O/P EST LOW 20 MIN: CPT

## 2025-03-26 PROCEDURE — OTHER PRESCRIPTION MEDICATION MANAGEMENT: OTHER

## 2025-03-26 PROCEDURE — OTHER COUNSELING: OTHER

## 2025-03-26 ASSESSMENT — LOCATION ZONE DERM: LOCATION ZONE: ARM

## 2025-03-26 ASSESSMENT — LOCATION DETAILED DESCRIPTION DERM: LOCATION DETAILED: RIGHT VENTRAL DISTAL FOREARM

## 2025-03-26 ASSESSMENT — LOCATION SIMPLE DESCRIPTION DERM: LOCATION SIMPLE: RIGHT FOREARM

## 2025-03-26 NOTE — PROCEDURE: PRESCRIPTION MEDICATION MANAGEMENT
Render In Strict Bullet Format?: No
Detail Level: Zone
Discontinue Regimen: -Mupirocin 2% ointment
Initiate Treatment: -Zoryve 0.15% Cream QD (samples provided)
Plan: -Follow up PRN if not fully resolved
Samples Given: -Aquaphor, Vaseline, CeraVe ointment BID